# Patient Record
Sex: FEMALE | Race: WHITE | Employment: OTHER | URBAN - METROPOLITAN AREA
[De-identification: names, ages, dates, MRNs, and addresses within clinical notes are randomized per-mention and may not be internally consistent; named-entity substitution may affect disease eponyms.]

---

## 2019-03-08 ENCOUNTER — APPOINTMENT (OUTPATIENT)
Dept: GENERAL RADIOLOGY | Age: 84
DRG: 291 | End: 2019-03-08
Attending: EMERGENCY MEDICINE
Payer: MEDICARE

## 2019-03-08 ENCOUNTER — HOSPITAL ENCOUNTER (INPATIENT)
Age: 84
LOS: 3 days | Discharge: NURSING, REHAB OR LONG-TERM CARE | DRG: 291 | End: 2019-03-14
Attending: EMERGENCY MEDICINE | Admitting: INTERNAL MEDICINE
Payer: MEDICARE

## 2019-03-08 DIAGNOSIS — J90 PLEURAL EFFUSION: ICD-10-CM

## 2019-03-08 DIAGNOSIS — I48.20 CHRONIC ATRIAL FIBRILLATION (HCC): Primary | ICD-10-CM

## 2019-03-08 DIAGNOSIS — E87.70 HYPERVOLEMIA, UNSPECIFIED HYPERVOLEMIA TYPE: ICD-10-CM

## 2019-03-08 DIAGNOSIS — R06.02 SOB (SHORTNESS OF BREATH): ICD-10-CM

## 2019-03-08 PROBLEM — I50.9 CHF (CONGESTIVE HEART FAILURE) (HCC): Status: ACTIVE | Noted: 2019-03-08

## 2019-03-08 PROBLEM — R00.1 BRADYCARDIA: Status: ACTIVE | Noted: 2019-03-08

## 2019-03-08 LAB
ALBUMIN SERPL-MCNC: 3.6 G/DL (ref 3.2–4.6)
ALBUMIN/GLOB SERPL: 0.8 {RATIO} (ref 1.2–3.5)
ALP SERPL-CCNC: 98 U/L (ref 50–136)
ALT SERPL-CCNC: 8 U/L (ref 12–65)
ANION GAP SERPL CALC-SCNC: 8 MMOL/L (ref 7–16)
APTT PPP: 33.3 SEC (ref 24.7–39.8)
AST SERPL-CCNC: 10 U/L (ref 15–37)
ATRIAL RATE: 49 BPM
BASOPHILS # BLD: 0.1 K/UL (ref 0–0.2)
BASOPHILS NFR BLD: 1 % (ref 0–2)
BILIRUB SERPL-MCNC: 0.5 MG/DL (ref 0.2–1.1)
BNP SERPL-MCNC: 330 PG/ML
BUN SERPL-MCNC: 30 MG/DL (ref 8–23)
CALCIUM SERPL-MCNC: 8.7 MG/DL (ref 8.3–10.4)
CALCULATED R AXIS, ECG10: 80 DEGREES
CALCULATED T AXIS, ECG11: 67 DEGREES
CHLORIDE SERPL-SCNC: 104 MMOL/L (ref 98–107)
CO2 SERPL-SCNC: 23 MMOL/L (ref 21–32)
CREAT SERPL-MCNC: 1.21 MG/DL (ref 0.6–1)
DIAGNOSIS, 93000: NORMAL
DIFFERENTIAL METHOD BLD: ABNORMAL
EOSINOPHIL # BLD: 0.1 K/UL (ref 0–0.8)
EOSINOPHIL NFR BLD: 2 % (ref 0.5–7.8)
ERYTHROCYTE [DISTWIDTH] IN BLOOD BY AUTOMATED COUNT: 18.3 % (ref 11.9–14.6)
GLOBULIN SER CALC-MCNC: 4.4 G/DL (ref 2.3–3.5)
GLUCOSE SERPL-MCNC: 112 MG/DL (ref 65–100)
HCT VFR BLD AUTO: 37.2 % (ref 35.8–46.3)
HGB BLD-MCNC: 11.3 G/DL (ref 11.7–15.4)
IMM GRANULOCYTES # BLD AUTO: 0 K/UL (ref 0–0.5)
IMM GRANULOCYTES NFR BLD AUTO: 0 % (ref 0–5)
INR PPP: 1.1
LYMPHOCYTES # BLD: 1.1 K/UL (ref 0.5–4.6)
LYMPHOCYTES NFR BLD: 18 % (ref 13–44)
MAGNESIUM SERPL-MCNC: 2.3 MG/DL (ref 1.8–2.4)
MCH RBC QN AUTO: 25.9 PG (ref 26.1–32.9)
MCHC RBC AUTO-ENTMCNC: 30.4 G/DL (ref 31.4–35)
MCV RBC AUTO: 85.1 FL (ref 79.6–97.8)
MONOCYTES # BLD: 0.9 K/UL (ref 0.1–1.3)
MONOCYTES NFR BLD: 15 % (ref 4–12)
NEUTS SEG # BLD: 4 K/UL (ref 1.7–8.2)
NEUTS SEG NFR BLD: 64 % (ref 43–78)
NRBC # BLD: 0 K/UL (ref 0–0.2)
PLATELET # BLD AUTO: 220 K/UL (ref 150–450)
PMV BLD AUTO: 11 FL (ref 9.4–12.3)
POTASSIUM SERPL-SCNC: 3.5 MMOL/L (ref 3.5–5.1)
PROT SERPL-MCNC: 8 G/DL (ref 6.3–8.2)
PROTHROMBIN TIME: 13.7 SEC (ref 11.7–14.5)
Q-T INTERVAL, ECG07: 458 MS
QRS DURATION, ECG06: 86 MS
QTC CALCULATION (BEZET), ECG08: 438 MS
RBC # BLD AUTO: 4.37 M/UL (ref 4.05–5.2)
SODIUM SERPL-SCNC: 135 MMOL/L (ref 136–145)
TROPONIN I BLD-MCNC: 0.02 NG/ML (ref 0.02–0.05)
TSH SERPL DL<=0.005 MIU/L-ACNC: 2.85 UIU/ML (ref 0.36–3.74)
VENTRICULAR RATE, ECG03: 55 BPM
WBC # BLD AUTO: 6.3 K/UL (ref 4.3–11.1)

## 2019-03-08 PROCEDURE — 85610 PROTHROMBIN TIME: CPT

## 2019-03-08 PROCEDURE — 83880 ASSAY OF NATRIURETIC PEPTIDE: CPT

## 2019-03-08 PROCEDURE — 93005 ELECTROCARDIOGRAM TRACING: CPT | Performed by: EMERGENCY MEDICINE

## 2019-03-08 PROCEDURE — 84443 ASSAY THYROID STIM HORMONE: CPT

## 2019-03-08 PROCEDURE — 80053 COMPREHEN METABOLIC PANEL: CPT

## 2019-03-08 PROCEDURE — 85730 THROMBOPLASTIN TIME PARTIAL: CPT

## 2019-03-08 PROCEDURE — 74011250637 HC RX REV CODE- 250/637: Performed by: FAMILY MEDICINE

## 2019-03-08 PROCEDURE — 99285 EMERGENCY DEPT VISIT HI MDM: CPT | Performed by: EMERGENCY MEDICINE

## 2019-03-08 PROCEDURE — 99218 HC RM OBSERVATION: CPT

## 2019-03-08 PROCEDURE — 74011250636 HC RX REV CODE- 250/636: Performed by: NURSE PRACTITIONER

## 2019-03-08 PROCEDURE — 93306 TTE W/DOPPLER COMPLETE: CPT

## 2019-03-08 PROCEDURE — 74011250637 HC RX REV CODE- 250/637: Performed by: INTERNAL MEDICINE

## 2019-03-08 PROCEDURE — 83735 ASSAY OF MAGNESIUM: CPT

## 2019-03-08 PROCEDURE — 96374 THER/PROPH/DIAG INJ IV PUSH: CPT | Performed by: EMERGENCY MEDICINE

## 2019-03-08 PROCEDURE — 85025 COMPLETE CBC W/AUTO DIFF WBC: CPT

## 2019-03-08 PROCEDURE — 84484 ASSAY OF TROPONIN QUANT: CPT

## 2019-03-08 PROCEDURE — 71045 X-RAY EXAM CHEST 1 VIEW: CPT

## 2019-03-08 PROCEDURE — 74011250636 HC RX REV CODE- 250/636: Performed by: INTERNAL MEDICINE

## 2019-03-08 PROCEDURE — 74011250636 HC RX REV CODE- 250/636: Performed by: EMERGENCY MEDICINE

## 2019-03-08 RX ORDER — METOPROLOL SUCCINATE 50 MG/1
50 TABLET, EXTENDED RELEASE ORAL DAILY
Status: DISCONTINUED | OUTPATIENT
Start: 2019-03-09 | End: 2019-03-08

## 2019-03-08 RX ORDER — FUROSEMIDE 10 MG/ML
40 INJECTION INTRAMUSCULAR; INTRAVENOUS EVERY 12 HOURS
Status: DISCONTINUED | OUTPATIENT
Start: 2019-03-08 | End: 2019-03-08 | Stop reason: SDUPTHER

## 2019-03-08 RX ORDER — FACIAL-BODY WIPES
10 EACH TOPICAL DAILY PRN
Status: DISCONTINUED | OUTPATIENT
Start: 2019-03-08 | End: 2019-03-10

## 2019-03-08 RX ORDER — POTASSIUM CHLORIDE 20 MEQ/1
40 TABLET, EXTENDED RELEASE ORAL
Status: COMPLETED | OUTPATIENT
Start: 2019-03-08 | End: 2019-03-08

## 2019-03-08 RX ORDER — FUROSEMIDE 10 MG/ML
40 INJECTION INTRAMUSCULAR; INTRAVENOUS 2 TIMES DAILY
Status: DISCONTINUED | OUTPATIENT
Start: 2019-03-08 | End: 2019-03-09

## 2019-03-08 RX ORDER — HYDRALAZINE HYDROCHLORIDE 20 MG/ML
10 INJECTION INTRAMUSCULAR; INTRAVENOUS
Status: DISCONTINUED | OUTPATIENT
Start: 2019-03-08 | End: 2019-03-14 | Stop reason: HOSPADM

## 2019-03-08 RX ORDER — HYDROCODONE BITARTRATE AND ACETAMINOPHEN 5; 325 MG/1; MG/1
1 TABLET ORAL
Status: DISCONTINUED | OUTPATIENT
Start: 2019-03-08 | End: 2019-03-14 | Stop reason: HOSPADM

## 2019-03-08 RX ORDER — BENZONATATE 100 MG/1
100 CAPSULE ORAL
Status: DISCONTINUED | OUTPATIENT
Start: 2019-03-08 | End: 2019-03-14 | Stop reason: HOSPADM

## 2019-03-08 RX ORDER — ENOXAPARIN SODIUM 100 MG/ML
30 INJECTION SUBCUTANEOUS EVERY 24 HOURS
Status: DISCONTINUED | OUTPATIENT
Start: 2019-03-08 | End: 2019-03-13

## 2019-03-08 RX ORDER — AMLODIPINE BESYLATE 5 MG/1
5 TABLET ORAL DAILY
Status: DISCONTINUED | OUTPATIENT
Start: 2019-03-09 | End: 2019-03-14 | Stop reason: HOSPADM

## 2019-03-08 RX ORDER — FUROSEMIDE 10 MG/ML
40 INJECTION INTRAMUSCULAR; INTRAVENOUS
Status: COMPLETED | OUTPATIENT
Start: 2019-03-08 | End: 2019-03-08

## 2019-03-08 RX ORDER — LISINOPRIL 20 MG/1
20 TABLET ORAL 2 TIMES DAILY
Status: DISCONTINUED | OUTPATIENT
Start: 2019-03-08 | End: 2019-03-14 | Stop reason: HOSPADM

## 2019-03-08 RX ORDER — ATORVASTATIN CALCIUM 40 MG/1
40 TABLET, FILM COATED ORAL DAILY
Status: DISCONTINUED | OUTPATIENT
Start: 2019-03-09 | End: 2019-03-14 | Stop reason: HOSPADM

## 2019-03-08 RX ORDER — POTASSIUM CHLORIDE 20 MEQ/1
20 TABLET, EXTENDED RELEASE ORAL 2 TIMES DAILY
Status: DISCONTINUED | OUTPATIENT
Start: 2019-03-08 | End: 2019-03-09

## 2019-03-08 RX ORDER — NALOXONE HYDROCHLORIDE 0.4 MG/ML
0.4 INJECTION, SOLUTION INTRAMUSCULAR; INTRAVENOUS; SUBCUTANEOUS AS NEEDED
Status: DISCONTINUED | OUTPATIENT
Start: 2019-03-08 | End: 2019-03-14 | Stop reason: HOSPADM

## 2019-03-08 RX ORDER — ASPIRIN 81 MG/1
81 TABLET ORAL DAILY
Status: DISCONTINUED | OUTPATIENT
Start: 2019-03-09 | End: 2019-03-14 | Stop reason: HOSPADM

## 2019-03-08 RX ORDER — SODIUM CHLORIDE 0.9 % (FLUSH) 0.9 %
5-40 SYRINGE (ML) INJECTION EVERY 8 HOURS
Status: DISCONTINUED | OUTPATIENT
Start: 2019-03-08 | End: 2019-03-14 | Stop reason: HOSPADM

## 2019-03-08 RX ORDER — SODIUM CHLORIDE 0.9 % (FLUSH) 0.9 %
5-40 SYRINGE (ML) INJECTION AS NEEDED
Status: DISCONTINUED | OUTPATIENT
Start: 2019-03-08 | End: 2019-03-14 | Stop reason: HOSPADM

## 2019-03-08 RX ORDER — ACETAMINOPHEN 325 MG/1
650 TABLET ORAL
Status: DISCONTINUED | OUTPATIENT
Start: 2019-03-08 | End: 2019-03-14 | Stop reason: HOSPADM

## 2019-03-08 RX ORDER — METOPROLOL SUCCINATE 25 MG/1
25 TABLET, EXTENDED RELEASE ORAL DAILY
Status: DISCONTINUED | OUTPATIENT
Start: 2019-03-09 | End: 2019-03-08

## 2019-03-08 RX ORDER — FUROSEMIDE 10 MG/ML
40 INJECTION INTRAMUSCULAR; INTRAVENOUS EVERY 12 HOURS
Status: DISCONTINUED | OUTPATIENT
Start: 2019-03-08 | End: 2019-03-08

## 2019-03-08 RX ADMIN — LISINOPRIL 20 MG: 20 TABLET ORAL at 18:18

## 2019-03-08 RX ADMIN — POTASSIUM CHLORIDE 20 MEQ: 20 TABLET, EXTENDED RELEASE ORAL at 18:18

## 2019-03-08 RX ADMIN — Medication 5 ML: at 14:21

## 2019-03-08 RX ADMIN — Medication 5 ML: at 21:21

## 2019-03-08 RX ADMIN — POTASSIUM CHLORIDE 40 MEQ: 20 TABLET, EXTENDED RELEASE ORAL at 14:19

## 2019-03-08 RX ADMIN — HYDRALAZINE HYDROCHLORIDE 10 MG: 20 INJECTION INTRAMUSCULAR; INTRAVENOUS at 14:19

## 2019-03-08 RX ADMIN — FUROSEMIDE 40 MG: 10 INJECTION, SOLUTION INTRAMUSCULAR; INTRAVENOUS at 18:17

## 2019-03-08 RX ADMIN — FUROSEMIDE 40 MG: 10 INJECTION, SOLUTION INTRAMUSCULAR; INTRAVENOUS at 12:33

## 2019-03-08 RX ADMIN — BENZONATATE 100 MG: 100 CAPSULE ORAL at 22:29

## 2019-03-08 NOTE — H&P
Hospitalist H&P Note     Admit Date:  3/8/2019 10:31 AM   Name:  Ginny Mcqueen   Age:  80 y.o.  :  10/23/1926   MRN:  [id number]   PCP:  Susan Dove MD  Treatment Team: Attending Provider: Melida Oliveira MD; Primary Nurse: Jackson Robertson RN    HPI:   Patient history was obtained from the ER provider prior to seeing the patient. Patient with atrial fibrillation and high blood pressure. On amlodipine, Lotensin and metoprolol 50 mg. Went to primary care doctor's office with worsening shortness of breath and bilateral lower extremity edema along with a 10 pound weight gain. Has had a cough for the past week. On his evaluation noted, heart rate 34. On EKG. Sent here for further evaluation.        The history is provided by the patient and medical records. No  was used. Slow Heart Rate    This is a new problem. The current episode started less than 1 hour ago. The problem has not changed since onset. The problem occurs constantly. The pain is associated with normal activity. The patient is experiencing no pain. Associated symptoms include lower extremity edema, malaise/fatigue and shortness of breath. Pertinent negatives include no abdominal pain, no back pain, no diaphoresis, no dizziness, no exertional chest pressure, no fever, no headaches, no leg pain, no nausea, no numbness, no vomiting and no weakness. She has tried nothing for the symptoms. Risk factors include cardiac disease and hypertension. Her past medical history is significant for HTN.    3/8/2019  Cc: dyspnea  Progressive dyspnea lower ext edema and orthopnea last few days especially. Ventricular response low at primary care today and sent to er. She has afib chronically on bb rate control (unclear if toprol xl 50 daily or met. tartrate 50 bid), and aspirin. No cva prophyl with anticoag. Dure to age fall risk. She has cxr and exam c/w acute chf systolic vs diastolic. No hx of echo.  She has k of 3.5 and acute renal insuff creatinine 1.21 baseline <1 but she is 89 y.0. She is to be admitted re: acute new chf, acute renal insuff. Observation for now. 10 systems reviewed and negative except as noted in HPI. Past Medical History:   Diagnosis Date    CAD (coronary artery disease) 1/22/2013    Chronic atrial fibrillation ([facility] [address] 1/22/2013    CRI (chronic renal insufficiency), stage 3 (moderate) (HCC)     Gait disturbance 1/22/2013    Mixed hyperlipidemia 1/22/2013    Unspecified essential hypertension 1/22/2013      Past Surgical History:   Procedure Laterality Date    HX APPENDECTOMY      HX COLONOSCOPY  1996      No Known Allergies   Social History     Tobacco Use    Smoking status: Never Smoker    Smokeless tobacco: Never Used   Substance Use Topics    Alcohol use: No      History reviewed. No pertinent family history. Immunization History   Administered Date(s) Administered    Pneumococcal Polysaccharide (PPSV-23) 10/02/2000     PTA Medications:  Prior to Admission Medications   Prescriptions Last Dose Informant Patient Reported? Taking? amLODIPine (NORVASC) 2.5 mg tablet   No No   Sig: Take 1 Tab by mouth daily. aspirin delayed-release 81 mg tablet   Yes No   Sig: Take  by mouth daily. atorvastatin (LIPITOR) 40 mg tablet   No No   Sig: Take 1 Tab by mouth daily. benazepril-hydroCHLOROthiazide (LOTENSIN HCT) 20-12.5 mg per tablet   No No   Sig: TAKE TWO TABLETS BY MOUTH DAILY   cyanocobalamin (VITAMIN B-12) 1,000 mcg tablet   No No   Sig: Take 1 tablet by mouth daily.    ferrous sulfate 325 mg (65 mg iron) tablet   No No   Sig: One pill every other day   metoprolol tartrate (LOPRESSOR) 50 mg tablet   No No   Sig: TAKE ONE TABLET BY MOUTH TWICE DAILY      Facility-Administered Medications: None       Objective:     Patient Vitals for the past 24 hrs:   Temp Pulse Resp BP SpO2   03/08/19 1201    185/79 95 %   03/08/19 1156     96 %   03/08/19 1130    175/75 91 %   03/08/19 1036 98.4 °F (36.9 °C) (!) 58 18 (!) 208/91 97 %     Oxygen Therapy  O2 Sat (%): 95 % (03/08/19 1201)  Pulse via Oximetry: 47 beats per minute (03/08/19 1201)  O2 Device: Room air (03/08/19 1036)  No intake or output data in the 24 hours ending 03/08/19 1233    Physical Exam:  General:    Well nourished. Alert. Maintained well for age  Eyes:   Normal sclera. Extraocular movements intact. ENT:  Normocephalic, atraumatic. Moist mucous membranes  Neck:  Supple, no lymphadenopathy positive jvd  CV:   Irregular vent. rate is 50's to 60's at time my exam.  No m/r/g. Peripheral pulses 2+. Capillary refill <2s. Lungs:  Bilateral rales to bases, no wheeze  Abdomen: Soft, nontender, nondistended. Extremities: Warm and dry. No cyanosis positive lower extremity bl edema. Neurologic: CN II-XII grossly intact. Sensation intact. Skin:     No rashes or jaundice. Normal coloration  Psych:  Normal mood and affect. I reviewed the labs, imaging, EKGs, telemetry, and other studies done this admission. Data Review:   Recent Results (from the past 24 hour(s))   CBC WITH AUTOMATED DIFF    Collection Time: 03/08/19 10:44 AM   Result Value Ref Range    WBC 6.3 4.3 - 11.1 K/uL    RBC 4.37 4.05 - 5.2 M/uL    HGB 11.3 (L) 11.7 - 15.4 g/dL    HCT 37.2 35.8 - 46.3 %    MCV 85.1 79.6 - 97.8 FL    MCH 25.9 (L) 26.1 - 32.9 PG    MCHC 30.4 (L) 31.4 - 35.0 g/dL    RDW 18.3 (H) 11.9 - 14.6 %    PLATELET 635 791 - 151 K/uL    MPV 11.0 9.4 - 12.3 FL    ABSOLUTE NRBC 0.00 0.0 - 0.2 K/uL    DF AUTOMATED      NEUTROPHILS 64 43 - 78 %    LYMPHOCYTES 18 13 - 44 %    MONOCYTES 15 (H) 4.0 - 12.0 %    EOSINOPHILS 2 0.5 - 7.8 %    BASOPHILS 1 0.0 - 2.0 %    IMMATURE GRANULOCYTES 0 0.0 - 5.0 %    ABS. NEUTROPHILS 4.0 1.7 - 8.2 K/UL    ABS. LYMPHOCYTES 1.1 0.5 - 4.6 K/UL    ABS. MONOCYTES 0.9 0.1 - 1.3 K/UL    ABS. EOSINOPHILS 0.1 0.0 - 0.8 K/UL    ABS. BASOPHILS 0.1 0.0 - 0.2 K/UL    ABS. IMM.  GRANS. 0.0 0.0 - 0.5 K/UL   METABOLIC PANEL, COMPREHENSIVE    Collection Time: 03/08/19 10:44 AM   Result Value Ref Range    Sodium 135 (L) 136 - 145 mmol/L    Potassium 3.5 3.5 - 5.1 mmol/L    Chloride 104 98 - 107 mmol/L    CO2 23 21 - 32 mmol/L    Anion gap 8 7 - 16 mmol/L    Glucose 112 (H) 65 - 100 mg/dL    BUN 30 (H) 8 - 23 MG/DL    Creatinine 1.21 (H) 0.6 - 1.0 MG/DL    GFR est AA 54 (L) >60 ml/min/1.73m2    GFR est non-AA 44 (L) >60 ml/min/1.73m2    Calcium 8.7 8.3 - 10.4 MG/DL    Bilirubin, total 0.5 0.2 - 1.1 MG/DL    ALT (SGPT) 8 (L) 12 - 65 U/L    AST (SGOT) 10 (L) 15 - 37 U/L    Alk. phosphatase 98 50 - 136 U/L    Protein, total 8.0 6.3 - 8.2 g/dL    Albumin 3.6 3.2 - 4.6 g/dL    Globulin 4.4 (H) 2.3 - 3.5 g/dL    A-G Ratio 0.8 (L) 1.2 - 3.5     MAGNESIUM    Collection Time: 03/08/19 10:44 AM   Result Value Ref Range    Magnesium 2.3 1.8 - 2.4 mg/dL   TSH 3RD GENERATION    Collection Time: 03/08/19 10:44 AM   Result Value Ref Range    TSH 2.850 0.358 - 3.740 uIU/mL   BNP    Collection Time: 03/08/19 10:44 AM   Result Value Ref Range     (H) 0 pg/mL   PROTHROMBIN TIME + INR    Collection Time: 03/08/19 10:44 AM   Result Value Ref Range    Prothrombin time 13.7 11.7 - 14.5 sec    INR 1.1     PTT    Collection Time: 03/08/19 10:44 AM   Result Value Ref Range    aPTT 33.3 24.7 - 39.8 SEC   POC TROPONIN-I    Collection Time: 03/08/19 11:07 AM   Result Value Ref Range    Troponin-I (POC) 0.02 0.02 - 0.05 ng/ml       All Micro Results     None          Other Studies:  Xr Chest Port    Result Date: 3/8/2019  Portable chest x-ray INDICATION: Generalized weakness with fatigue FINDINGS: No prior studies for comparison. EKG leads are present over the chest. Cardiac silhouette is enlarged. Small bilateral pleural effusions with diffuse interstitial lung prominence. This is probably pulmonary edema. No convincing pneumothorax. IMPRESSION: Cardiomegaly and pulmonary edema. CHF exacerbation with small pleural effusions.       Assessment and Plan: Hospital Problems as of 3/8/2019 Date Reviewed: 3/8/2019    None          PLAN:  --acute chf syst vs dd    Echo, card consult. Lisinopril bid, toprol xl 50, lasix 40 iv bid for now.     --acute renal insuff. ckd II    Monitor with treatment chf.     --chronic afib. Bb rate control . Agree risk benefit of a.c.   --borderline hypokalemia   Oral replace. Check mg, goal k 4 mg 2    Will need fixed dose when oral loop diuretic dose is determined. Discharge planning:  Home   DVT ppx: lovenox    Code status:  dnr  Decision Maker:self. aAron Ruby son is fernandaoa lives next door.  [phone] [facility] is at bedside      Admit status:observation          Estimated LOS: less than 2  midnights  Risk:  high    Signed:  Jeannie Scanlon DO

## 2019-03-08 NOTE — ED PROVIDER NOTES
Patient with atrial fibrillation and high blood pressure. On amlodipine, Lotensin and metoprolol 50 mg. Went to primary care doctor's office with worsening shortness of breath and bilateral lower extremity edema along with a 10 pound weight gain. Has had a cough for the past week. On his evaluation noted, heart rate 34. On EKG. Sent here for further evaluation. The history is provided by the patient and medical records. No  was used. Slow Heart Rate    This is a new problem. The current episode started less than 1 hour ago. The problem has not changed since onset. The problem occurs constantly. The pain is associated with normal activity. The patient is experiencing no pain. Associated symptoms include lower extremity edema, malaise/fatigue and shortness of breath. Pertinent negatives include no abdominal pain, no back pain, no diaphoresis, no dizziness, no exertional chest pressure, no fever, no headaches, no leg pain, no nausea, no numbness, no vomiting and no weakness. She has tried nothing for the symptoms. Risk factors include cardiac disease and hypertension. Her past medical history is significant for HTN. Past Medical History:   Diagnosis Date    CAD (coronary artery disease) 1/22/2013    Chronic atrial fibrillation ([facility] [address] 1/22/2013    CRI (chronic renal insufficiency), stage 3 (moderate) (HCC)     Gait disturbance 1/22/2013    Mixed hyperlipidemia 1/22/2013    Unspecified essential hypertension 1/22/2013       Past Surgical History:   Procedure Laterality Date    HX APPENDECTOMY      HX COLONOSCOPY  1996         History reviewed. No pertinent family history.     Social History     Socioeconomic History    Marital status:      Spouse name: Not on file    Number of children: Not on file    Years of education: Not on file    Highest education level: Not on file   Social Needs    Financial resource strain: Not on file    Food insecurity - worry: Not on file  Food insecurity - inability: Not on file    Transportation needs - medical: Not on file   FANCRU needs - non-medical: Not on file   Occupational History    Not on file   Tobacco Use    Smoking status: Never Smoker    Smokeless tobacco: Never Used   Substance and Sexual Activity    Alcohol use: No    Drug use: Not on file    Sexual activity: Not on file   Other Topics Concern    Not on file   Social History Narrative    Son Ines Iyer lives next door, has HCPOA. Patient has living will and desires no CPR, She refuses all screening exams. She gets Meals on Wheels. Her daughter comes over once a week to do [employer]. Her son does her finances. She does not drive. She walks with a 4 point cane. ALLERGIES: Patient has no known allergies. Review of Systems   Constitutional: Positive for fatigue and malaise/fatigue. Negative for chills, diaphoresis and fever. HENT: Negative for rhinorrhea and sore throat. Eyes: Negative for pain and redness. Respiratory: Positive for shortness of breath. Negative for chest tightness and wheezing. Cardiovascular: Positive for leg swelling. Negative for chest pain. Gastrointestinal: Negative for abdominal pain, diarrhea, nausea and vomiting. Genitourinary: Negative for dysuria and hematuria. Musculoskeletal: Negative for back pain, gait problem, neck pain and neck stiffness. Skin: Negative for color change and rash. Neurological: Negative for dizziness, weakness, numbness and headaches. Vitals:    03/08/19 1036   BP: (!) 208/91   Pulse: (!) 58   Resp: 18   Temp: 98.4 °F (36.9 °C)   SpO2: 97%   Weight: 73.9 kg (163 lb)   Height: 5' 3\" (1.6 m)            Physical Exam   Constitutional: She is oriented to person, place, and time. She appears well-developed and well-nourished. HENT:   Head: Normocephalic and atraumatic. Neck: Normal range of motion. Neck supple. Cardiovascular:  An irregularly irregular rhythm present. Bradycardia present. Pulmonary/Chest: Breath sounds normal. She is in respiratory distress (mild increased effort). Abdominal: Soft. Bowel sounds are normal. There is no tenderness. Musculoskeletal: Normal range of motion. She exhibits edema. Neurological: She is alert and oriented to person, place, and time. Skin: Skin is warm and dry. MDM  Number of Diagnoses or Management Options  Chronic atrial fibrillation ([address]): Hypervolemia, unspecified hypervolemia type:   Pleural effusion:   SOB (shortness of breath):   Diagnosis management comments: Patient with atrial fibrillation and heart rate dropping into the low 40s every once in a while. Had an episode in the mid 35s at primary care doctor's office. Also, has shortness of breath and volume overload with right pleural effusion, so will admit to the hospital.       Amount and/or Complexity of Data Reviewed  Clinical lab tests: reviewed and ordered  Tests in the radiology section of CPT®: ordered and reviewed  Tests in the medicine section of CPT®: ordered and reviewed    Patient Progress  Patient progress: stable         Procedures      EKG: nonspecific ST and T waves changes, atrial fibrillation, rate 55. XR CHEST PORT (Final result)   Result time 03/08/19 12:00:41   Final result by Barrington Mcgrath MD (03/08/19 12:00:41)                Impression:    IMPRESSION: Cardiomegaly and pulmonary edema. CHF exacerbation with small  pleural effusions. Narrative:    Portable chest x-ray    INDICATION: Generalized weakness with fatigue    FINDINGS: No prior studies for comparison. EKG leads are present over the chest.  Cardiac silhouette is enlarged. Small bilateral pleural effusions with diffuse  interstitial lung prominence. This is probably pulmonary edema. No convincing  pneumothorax.               Results Include:    Recent Results (from the past 24 hour(s))   CBC WITH AUTOMATED DIFF    Collection Time: 03/08/19 10:44 AM Result Value Ref Range    WBC 6.3 4.3 - 11.1 K/uL    RBC 4.37 4.05 - 5.2 M/uL    HGB 11.3 (L) 11.7 - 15.4 g/dL    HCT 37.2 35.8 - 46.3 %    MCV 85.1 79.6 - 97.8 FL    MCH 25.9 (L) 26.1 - 32.9 PG    MCHC 30.4 (L) 31.4 - 35.0 g/dL    RDW 18.3 (H) 11.9 - 14.6 %    PLATELET 336 329 - 426 K/uL    MPV 11.0 9.4 - 12.3 FL    ABSOLUTE NRBC 0.00 0.0 - 0.2 K/uL    DF AUTOMATED      NEUTROPHILS 64 43 - 78 %    LYMPHOCYTES 18 13 - 44 %    MONOCYTES 15 (H) 4.0 - 12.0 %    EOSINOPHILS 2 0.5 - 7.8 %    BASOPHILS 1 0.0 - 2.0 %    IMMATURE GRANULOCYTES 0 0.0 - 5.0 %    ABS. NEUTROPHILS 4.0 1.7 - 8.2 K/UL    ABS. LYMPHOCYTES 1.1 0.5 - 4.6 K/UL    ABS. MONOCYTES 0.9 0.1 - 1.3 K/UL    ABS. EOSINOPHILS 0.1 0.0 - 0.8 K/UL    ABS. BASOPHILS 0.1 0.0 - 0.2 K/UL    ABS. IMM. GRANS. 0.0 0.0 - 0.5 K/UL   METABOLIC PANEL, COMPREHENSIVE    Collection Time: 03/08/19 10:44 AM   Result Value Ref Range    Sodium 135 (L) 136 - 145 mmol/L    Potassium 3.5 3.5 - 5.1 mmol/L    Chloride 104 98 - 107 mmol/L    CO2 23 21 - 32 mmol/L    Anion gap 8 7 - 16 mmol/L    Glucose 112 (H) 65 - 100 mg/dL    BUN 30 (H) 8 - 23 MG/DL    Creatinine 1.21 (H) 0.6 - 1.0 MG/DL    GFR est AA 54 (L) >60 ml/min/1.73m2    GFR est non-AA 44 (L) >60 ml/min/1.73m2    Calcium 8.7 8.3 - 10.4 MG/DL    Bilirubin, total 0.5 0.2 - 1.1 MG/DL    ALT (SGPT) 8 (L) 12 - 65 U/L    AST (SGOT) 10 (L) 15 - 37 U/L    Alk.  phosphatase 98 50 - 136 U/L    Protein, total 8.0 6.3 - 8.2 g/dL    Albumin 3.6 3.2 - 4.6 g/dL    Globulin 4.4 (H) 2.3 - 3.5 g/dL    A-G Ratio 0.8 (L) 1.2 - 3.5     MAGNESIUM    Collection Time: 03/08/19 10:44 AM   Result Value Ref Range    Magnesium 2.3 1.8 - 2.4 mg/dL   TSH 3RD GENERATION    Collection Time: 03/08/19 10:44 AM   Result Value Ref Range    TSH 2.850 0.358 - 3.740 uIU/mL   BNP    Collection Time: 03/08/19 10:44 AM   Result Value Ref Range     (H) 0 pg/mL   PROTHROMBIN TIME + INR    Collection Time: 03/08/19 10:44 AM   Result Value Ref Range    Prothrombin time 13.7 11.7 - 14.5 sec    INR 1.1     PTT    Collection Time: 03/08/19 10:44 AM   Result Value Ref Range    aPTT 33.3 24.7 - 39.8 SEC   POC TROPONIN-I    Collection Time: 03/08/19 11:07 AM   Result Value Ref Range    Troponin-I (POC) 0.02 0.02 - 0.05 ng/ml

## 2019-03-08 NOTE — ED TRIAGE NOTES
Patient was at her family doctor today and was told her hr was low there in the 30's. Patient states she was there for cough and congestion. Patient also complains of generalized weakness, fatigue and shortness of breath.

## 2019-03-08 NOTE — CONSULTS
[facility] Cardiology Consult                Date of  Admission: 3/8/2019 10:31 AM     Primary Care Physician: Dr. Hi Bishop  Primary Cardiologist: CELY  Referring Physician: Hospitalist   Consulting Physician: Dr. Vipin Doss    CC/Reason for consult: CHF      Thaddeus Chavez is a 80 y.o. female with prior h/o HTN, HLP, and permanent A. fib that declines anticoagulation. .  Patient was seen in PCPs office today with c/o SOB/cough and lower ext edema x 1 week with 10 pound weight gain. The patient had an EKG that showed HR 34 so she was sent to ED for further care. Labs in ED showed trop neg, , chest xray showed Cardiomegaly and pulmonary edema. CHF exacerbation with small pleural effusions. EKG showed A. Fib with SVR. Hospitalist admitted for CHF and CLAUDE and consulted cardiology for CHF. Lasix IV given in ED prior to admission and her B/P was 208/91. Diagnosis    Mixed hyperlipidemia    Hypertension    Chronic atrial fibrillation (HCC)    Gait disturbance    Carotid atherosclerosis    Frail elderly    MCI (mild cognitive impairment)    Vitamin B12 deficiency    CRI (chronic renal insufficiency), stage 3 (moderate) (HCC)    Shortness of breath    Bradycardia    CHF (congestive heart failure) (HCC)       Past Medical History:   Diagnosis Date    CAD (coronary artery disease) 1/22/2013    Chronic atrial fibrillation (HCC) 1/22/2013    CRI (chronic renal insufficiency), stage 3 (moderate) (HCC)     Gait disturbance 1/22/2013    Mixed hyperlipidemia 1/22/2013    Unspecified essential hypertension 1/22/2013      Past Surgical History:   Procedure Laterality Date    HX APPENDECTOMY      HX COLONOSCOPY  1996     No Known Allergies   History reviewed. No pertinent family history.      Current Facility-Administered Medications   Medication Dose Route Frequency    sodium chloride (NS) flush 5-40 mL  5-40 mL IntraVENous Q8H    sodium chloride (NS) flush 5-40 mL  5-40 mL IntraVENous PRN    acetaminophen (TYLENOL) tablet 650 mg  650 mg Oral Q4H PRN    HYDROcodone-acetaminophen (NORCO) 5-325 mg per tablet 1 Tab  1 Tab Oral Q4H PRN    naloxone (NARCAN) injection 0.4 mg  0.4 mg IntraVENous PRN    bisacodyl (DULCOLAX) suppository 10 mg  10 mg Rectal DAILY PRN    enoxaparin (LOVENOX) injection 30 mg  30 mg SubCUTAneous Q24H    potassium chloride (K-DUR, KLOR-CON) SR tablet 40 mEq  40 mEq Oral NOW    potassium chloride (K-DUR, KLOR-CON) SR tablet 20 mEq  20 mEq Oral BID    furosemide (LASIX) injection 40 mg  40 mg IntraVENous Q12H    lisinopril (PRINIVIL, ZESTRIL) tablet 20 mg  20 mg Oral BID    [START ON 3/9/2019] metoprolol succinate (TOPROL-XL) XL tablet 50 mg  50 mg Oral DAILY    [START ON 3/9/2019] atorvastatin (LIPITOR) tablet 40 mg  40 mg Oral DAILY    [START ON 3/9/2019] aspirin delayed-release tablet 81 mg  81 mg Oral DAILY    hydrALAZINE (APRESOLINE) 20 mg/mL injection 10 mg  10 mg IntraVENous Q8H PRN       Review of Systems   Constitution: Positive for weight gain. Negative for diaphoresis, weakness and malaise/fatigue. HENT: Negative for congestion. Cardiovascular: Positive for dyspnea on exertion and leg swelling. Negative for chest pain, claudication, cyanosis, irregular heartbeat, near-syncope, orthopnea, palpitations, paroxysmal nocturnal dyspnea and syncope. Respiratory: Positive for shortness of breath. Negative for cough and wheezing. Endocrine: Negative for cold intolerance and heat intolerance. Hematologic/Lymphatic: Does not bruise/bleed easily. Skin: Negative for nail changes. Neurological: Negative for dizziness and headaches.         Physical Exam  Vitals:    03/08/19 1231 03/08/19 1233 03/08/19 1259 03/08/19 1342   BP: 191/79 191/79  195/87   Pulse:  (!) 55  76   Resp:    16   Temp:    97.8 °F (36.6 °C)   SpO2: 94%  96% 96%   Weight:    73.5 kg (162 lb 1.6 oz)   Height:           Physical Exam:  General: Well Developed, Well Nourished, No Acute Distress  HEENT: pupils equal and round, no abnormalities noted  Neck: supple, no JVD, no carotid bruits  Heart: S1S2 irregular irregular and bradycardic without murmurs or gallops  Lungs: few scattered crackles but diminished in bases   Abd: soft, nontender, nondistended, with good bowel sounds  Ext: warm, 2+ edema, calves supple/nontender, pulses 2+ bilaterally  Skin: warm and dry  Psychiatric: Normal mood and affect  Neurologic: Alert and oriented X 3    Cardiographics    Telemetry: A. Fib with SVR  ECG: A. Fib with SVR  Echocardiogram: ordered    Labs:   Recent Labs     03/08/19  1044   *   K 3.5   MG 2.3   BUN 30*   CREA 1.21*   *   WBC 6.3   HGB 11.3*   HCT 37.2      INR 1.1        Assessment/Plan:     Assessment:      Principal Problem:    CHF (congestive heart failure) ([facility] [address] (6/6/0978)- likely diastolic HF secondary to uncontrolled HTN. Echo pending today. Will need B/P control. Will increase norvasc to 5 mg daily. Will continue IV lasix and daily labs. Also continue toprol (but lower dose with slower rates) and ACE. Active Problems:    Mixed hyperlipidemia (1/22/2013)- continue statin       Hypertension (1/22/2013)- not controlled on admission; improved now. Will increase norvasc. Chronic atrial fibrillation (HCC) (1/22/2013)- with SVR; will decrease Toprol dose. Patient refuses any aggressive measures and declines anticoagulation. Thank you very much for this referral. We appreciate the opportunity to participate in this patient's care. We will follow along with above stated plan.     Anamika Patrick NP  Consulting MD: Dr. Autumn Heart

## 2019-03-08 NOTE — PROGRESS NOTES
TRANSFER - IN REPORT:    Verbal report received from [facility], [address] on Anisa Mejía being received from ER for routine progression of care      Report consisted of patients Situation, Background, Assessment and Recommendations(SBAR). Information from the following report(s) SBAR, Kardex, [facility] and Recent Results was reviewed with the receiving nurse. Opportunity for questions and clarification was provided. Assessment completed upon patients arrival to unit and care assumed.

## 2019-03-08 NOTE — ED NOTES
Pt assisted to [facility] with grand-daughter. Pt is easily one assist and was able to transfer without difficulty, but appears anxious.

## 2019-03-08 NOTE — PROGRESS NOTES
CM chart review. PCP listed as Dr. Oscar Payne [facility]) / patient seen in office today 3-8-19.

## 2019-03-08 NOTE — ROUTINE PROCESS
TRANSFER - OUT REPORT:    Verbal report given to Major Devine RN  on Donna Moment  being transferred to [address] [place] [address] for routine progression of care       Report consisted of patients Situation, Background, Assessment and   Recommendations(SBAR). Information from the following report(s) SBAR was reviewed with the receiving nurse. Lines:   Peripheral IV 03/08/19 Right Antecubital (Active)   Site Assessment Clean, dry, & intact 3/8/2019 10:38 AM   Phlebitis Assessment 0 3/8/2019 10:38 AM   Infiltration Assessment 0 3/8/2019 10:38 AM   Dressing Status Clean, dry, & intact 3/8/2019 10:38 AM        Opportunity for questions and clarification was provided.       Patient transported with:   Monitor  Registered Nurse          19

## 2019-03-09 LAB
ALBUMIN SERPL-MCNC: 3.5 G/DL (ref 3.2–4.6)
ALBUMIN/GLOB SERPL: 0.9 {RATIO} (ref 1.2–3.5)
ALP SERPL-CCNC: 89 U/L (ref 50–136)
ALT SERPL-CCNC: 11 U/L (ref 12–65)
ANION GAP SERPL CALC-SCNC: 9 MMOL/L (ref 7–16)
AST SERPL-CCNC: 17 U/L (ref 15–37)
BASOPHILS # BLD: 0.1 K/UL (ref 0–0.2)
BASOPHILS NFR BLD: 1 % (ref 0–2)
BILIRUB SERPL-MCNC: 0.6 MG/DL (ref 0.2–1.1)
BUN SERPL-MCNC: 28 MG/DL (ref 8–23)
CALCIUM SERPL-MCNC: 8.7 MG/DL (ref 8.3–10.4)
CHLORIDE SERPL-SCNC: 104 MMOL/L (ref 98–107)
CO2 SERPL-SCNC: 24 MMOL/L (ref 21–32)
CREAT SERPL-MCNC: 1.09 MG/DL (ref 0.6–1)
DIFFERENTIAL METHOD BLD: ABNORMAL
EOSINOPHIL # BLD: 0 K/UL (ref 0–0.8)
EOSINOPHIL NFR BLD: 0 % (ref 0.5–7.8)
ERYTHROCYTE [DISTWIDTH] IN BLOOD BY AUTOMATED COUNT: 18.3 % (ref 11.9–14.6)
GLOBULIN SER CALC-MCNC: 4 G/DL (ref 2.3–3.5)
GLUCOSE SERPL-MCNC: 110 MG/DL (ref 65–100)
HCT VFR BLD AUTO: 31.8 % (ref 35.8–46.3)
HGB BLD-MCNC: 10.1 G/DL (ref 11.7–15.4)
IMM GRANULOCYTES # BLD AUTO: 0 K/UL (ref 0–0.5)
IMM GRANULOCYTES NFR BLD AUTO: 0 % (ref 0–5)
LYMPHOCYTES # BLD: 1 K/UL (ref 0.5–4.6)
LYMPHOCYTES NFR BLD: 14 % (ref 13–44)
MAGNESIUM SERPL-MCNC: 2 MG/DL (ref 1.8–2.4)
MCH RBC QN AUTO: 26 PG (ref 26.1–32.9)
MCHC RBC AUTO-ENTMCNC: 31.8 G/DL (ref 31.4–35)
MCV RBC AUTO: 82 FL (ref 79.6–97.8)
MONOCYTES # BLD: 1.2 K/UL (ref 0.1–1.3)
MONOCYTES NFR BLD: 16 % (ref 4–12)
NEUTS SEG # BLD: 5.1 K/UL (ref 1.7–8.2)
NEUTS SEG NFR BLD: 69 % (ref 43–78)
NRBC # BLD: 0 K/UL (ref 0–0.2)
PLATELET # BLD AUTO: 202 K/UL (ref 150–450)
PMV BLD AUTO: 10.5 FL (ref 9.4–12.3)
POTASSIUM SERPL-SCNC: 4.2 MMOL/L (ref 3.5–5.1)
PROT SERPL-MCNC: 7.5 G/DL (ref 6.3–8.2)
RBC # BLD AUTO: 3.88 M/UL (ref 4.05–5.2)
SODIUM SERPL-SCNC: 137 MMOL/L (ref 136–145)
WBC # BLD AUTO: 7.4 K/UL (ref 4.3–11.1)

## 2019-03-09 PROCEDURE — 80053 COMPREHEN METABOLIC PANEL: CPT

## 2019-03-09 PROCEDURE — 74011250636 HC RX REV CODE- 250/636: Performed by: INTERNAL MEDICINE

## 2019-03-09 PROCEDURE — 85025 COMPLETE CBC W/AUTO DIFF WBC: CPT

## 2019-03-09 PROCEDURE — 83735 ASSAY OF MAGNESIUM: CPT

## 2019-03-09 PROCEDURE — 74011250637 HC RX REV CODE- 250/637: Performed by: NURSE PRACTITIONER

## 2019-03-09 PROCEDURE — 74011250637 HC RX REV CODE- 250/637: Performed by: FAMILY MEDICINE

## 2019-03-09 PROCEDURE — 99218 HC RM OBSERVATION: CPT

## 2019-03-09 PROCEDURE — 74011250637 HC RX REV CODE- 250/637: Performed by: INTERNAL MEDICINE

## 2019-03-09 PROCEDURE — 36415 COLL VENOUS BLD VENIPUNCTURE: CPT

## 2019-03-09 RX ORDER — SPIRONOLACTONE 25 MG/1
25 TABLET ORAL DAILY
Status: DISCONTINUED | OUTPATIENT
Start: 2019-03-09 | End: 2019-03-14 | Stop reason: HOSPADM

## 2019-03-09 RX ORDER — FUROSEMIDE 40 MG/1
40 TABLET ORAL DAILY
Status: DISCONTINUED | OUTPATIENT
Start: 2019-03-09 | End: 2019-03-14 | Stop reason: HOSPADM

## 2019-03-09 RX ADMIN — ASPIRIN 81 MG: 81 TABLET, COATED ORAL at 09:21

## 2019-03-09 RX ADMIN — LISINOPRIL 20 MG: 20 TABLET ORAL at 09:21

## 2019-03-09 RX ADMIN — ACETAMINOPHEN 650 MG: 325 TABLET, FILM COATED ORAL at 00:13

## 2019-03-09 RX ADMIN — AMLODIPINE BESYLATE 5 MG: 5 TABLET ORAL at 09:21

## 2019-03-09 RX ADMIN — ATORVASTATIN CALCIUM 40 MG: 40 TABLET, FILM COATED ORAL at 09:20

## 2019-03-09 RX ADMIN — LISINOPRIL 20 MG: 20 TABLET ORAL at 18:06

## 2019-03-09 RX ADMIN — ACETAMINOPHEN 650 MG: 325 TABLET, FILM COATED ORAL at 21:19

## 2019-03-09 RX ADMIN — Medication 5 ML: at 06:01

## 2019-03-09 RX ADMIN — SPIRONOLACTONE 25 MG: 25 TABLET ORAL at 09:22

## 2019-03-09 RX ADMIN — ENOXAPARIN SODIUM 30 MG: 30 INJECTION, SOLUTION INTRAVENOUS; SUBCUTANEOUS at 14:23

## 2019-03-09 RX ADMIN — BENZONATATE 100 MG: 100 CAPSULE ORAL at 06:05

## 2019-03-09 RX ADMIN — Medication 5 ML: at 21:19

## 2019-03-09 RX ADMIN — FUROSEMIDE 40 MG: 40 TABLET ORAL at 09:21

## 2019-03-09 RX ADMIN — BENZONATATE 100 MG: 100 CAPSULE ORAL at 14:22

## 2019-03-09 NOTE — PROGRESS NOTES
Dual skin assessment completed & reveals the following: sacrum dry & intact, no redness or breakdown noted. Heels intact bilaterally. BLE with 2-3+ pitting edema, BLE flaky & dry with no breakdown. R buttocks with approx 8 healing scabbed circles approx 1cm in diameter- pt reports recent shingles outbreak. None are draining- all healed or scabbed over. Scattered scabs to BUE, no other abnormalities noted.

## 2019-03-09 NOTE — PROGRESS NOTES
Progress Note    Patient: Padmaja Du MRN: [id number]  SSN: [id number]    YOB: 1926  Age: 80 y.o. Sex: female      Admit Date: 3/8/2019    LOS: 0 days     Subjective:     PMH of AF, hypertension. Admitted due to weight gain, edema, shortness of breath, cough. Found to have bradycardia, CHF, CLAUDE. Improved with Lasix. Patient is breathing better. She is eating better. No chest pain. Less shortness of breath. No fever. No shaking. No chills. Objective:     Vitals:    03/09/19 0046 03/09/19 0457 03/09/19 0608 03/09/19 0911   BP: 154/68 168/70 158/54 154/54   Pulse: 66 80 81 79   Resp: 18 18  18   Temp: 98.4 °F (36.9 °C) 99 °F (37.2 °C)  98.8 °F (37.1 °C)   SpO2: 94% 94%  96%   Weight:  70.2 kg (154 lb 12.8 oz)     Height:            Intake and Output:  Current Shift: 03/09 0701 - 03/09 1900  In: -   Out: 300 [Urine:300]  Last three shifts: 03/07 1901 - 03/09 0700  In: 26 [P.O.:460]  Out: 2225 [Urine:2225]    Physical Exam:     General:                    The patient is a pleasant elderly female in mild acute distress when speaking long sentences. Head:                                   Normocephalic/atraumatic. Eyes:                                   No palpebral pallor or scleral icterus. ENT:                                    External auricular and nasal exam within normal limits. Mucous membranes are moist.  Neck:                                   Supple, non-tender, no JVD. Lungs:                       Clear to auscultation bilaterally without wheezes or crackles. Decreased breath sounds at bases. No respiratory distress or accessory muscle use. Heart:                                  irregular rate and rhythm, without murmurs, rubs, or gallops. Abdomen:                  Soft, non-tender, mildly distended with normoactive bowel sounds.    Genitourinary:           No tenderness over the bladder or bilateral CVAs. Extremities:               Without clubbing, cyanosis, 1+ edema. Skin:                                    Normal color, texture, and turgor. No rashes, lesions, or jaundice. Pulses:                      Radial and dorsalis pedis pulses present 2+ bilaterally. Capillary refill <2s. Neurologic:                CN II-XII grossly intact and symmetrical.                                               Moving all four extremities well with no focal deficits. Psychiatric:                Pleasant demeanor, appropriate affect. Alert and oriented x 3      Lab/Data Review:    Recent Results (from the past 24 hour(s))   EKG, 12 LEAD, INITIAL    Collection Time: 03/08/19 10:39 AM   Result Value Ref Range    Ventricular Rate 55 BPM    Atrial Rate 49 BPM    QRS Duration 86 ms    Q-T Interval 458 ms    QTC Calculation (Bezet) 438 ms    Calculated R Axis 80 degrees    Calculated T Axis 67 degrees    Diagnosis       Atrial fibrillation with slow ventricular response with premature ventricular   or aberrantly conducted complexes  Anteroseptal infarct , age undetermined  Abnormal ECG  No previous ECGs available  Confirmed by [facility]  MD (), FARIDEH SQUIRES ([id number]) on 3/8/2019 1:52:15 PM     CBC WITH AUTOMATED DIFF    Collection Time: 03/08/19 10:44 AM   Result Value Ref Range    WBC 6.3 4.3 - 11.1 K/uL    RBC 4.37 4.05 - 5.2 M/uL    HGB 11.3 (L) 11.7 - 15.4 g/dL    HCT 37.2 35.8 - 46.3 %    MCV 85.1 79.6 - 97.8 FL    MCH 25.9 (L) 26.1 - 32.9 PG    MCHC 30.4 (L) 31.4 - 35.0 g/dL    RDW 18.3 (H) 11.9 - 14.6 %    PLATELET 079 126 - 023 K/uL    MPV 11.0 9.4 - 12.3 FL    ABSOLUTE NRBC 0.00 0.0 - 0.2 K/uL    DF AUTOMATED      NEUTROPHILS 64 43 - 78 %    LYMPHOCYTES 18 13 - 44 %    MONOCYTES 15 (H) 4.0 - 12.0 %    EOSINOPHILS 2 0.5 - 7.8 %    BASOPHILS 1 0.0 - 2.0 %    IMMATURE GRANULOCYTES 0 0.0 - 5.0 %    ABS. NEUTROPHILS 4.0 1.7 - 8.2 K/UL    ABS. LYMPHOCYTES 1.1 0.5 - 4.6 K/UL    ABS. MONOCYTES 0.9 0.1 - 1.3 K/UL    ABS. EOSINOPHILS 0.1 0.0 - 0.8 K/UL    ABS. BASOPHILS 0.1 0.0 - 0.2 K/UL    ABS. IMM. GRANS. 0.0 0.0 - 0.5 K/UL   METABOLIC PANEL, COMPREHENSIVE    Collection Time: 03/08/19 10:44 AM   Result Value Ref Range    Sodium 135 (L) 136 - 145 mmol/L    Potassium 3.5 3.5 - 5.1 mmol/L    Chloride 104 98 - 107 mmol/L    CO2 23 21 - 32 mmol/L    Anion gap 8 7 - 16 mmol/L    Glucose 112 (H) 65 - 100 mg/dL    BUN 30 (H) 8 - 23 MG/DL    Creatinine 1.21 (H) 0.6 - 1.0 MG/DL    GFR est AA 54 (L) >60 ml/min/1.73m2    GFR est non-AA 44 (L) >60 ml/min/1.73m2    Calcium 8.7 8.3 - 10.4 MG/DL    Bilirubin, total 0.5 0.2 - 1.1 MG/DL    ALT (SGPT) 8 (L) 12 - 65 U/L    AST (SGOT) 10 (L) 15 - 37 U/L    Alk.  phosphatase 98 50 - 136 U/L    Protein, total 8.0 6.3 - 8.2 g/dL    Albumin 3.6 3.2 - 4.6 g/dL    Globulin 4.4 (H) 2.3 - 3.5 g/dL    A-G Ratio 0.8 (L) 1.2 - 3.5     MAGNESIUM    Collection Time: 03/08/19 10:44 AM   Result Value Ref Range    Magnesium 2.3 1.8 - 2.4 mg/dL   TSH 3RD GENERATION    Collection Time: 03/08/19 10:44 AM   Result Value Ref Range    TSH 2.850 0.358 - 3.740 uIU/mL   BNP    Collection Time: 03/08/19 10:44 AM   Result Value Ref Range     (H) 0 pg/mL   PROTHROMBIN TIME + INR    Collection Time: 03/08/19 10:44 AM   Result Value Ref Range    Prothrombin time 13.7 11.7 - 14.5 sec    INR 1.1     PTT    Collection Time: 03/08/19 10:44 AM   Result Value Ref Range    aPTT 33.3 24.7 - 39.8 SEC   POC TROPONIN-I    Collection Time: 03/08/19 11:07 AM   Result Value Ref Range    Troponin-I (POC) 0.02 0.02 - 7.48 ng/ml   METABOLIC PANEL, COMPREHENSIVE    Collection Time: 03/09/19  3:27 AM   Result Value Ref Range    Sodium 137 136 - 145 mmol/L    Potassium 4.2 3.5 - 5.1 mmol/L    Chloride 104 98 - 107 mmol/L    CO2 24 21 - 32 mmol/L    Anion gap 9 7 - 16 mmol/L    Glucose 110 (H) 65 - 100 mg/dL    BUN 28 (H) 8 - 23 MG/DL    Creatinine 1.09 (H) 0.6 - 1.0 MG/DL    GFR est AA >60 >60 ml/min/1.73m2    GFR est non-AA 50 (L) >60 ml/min/1.73m2    Calcium 8.7 8.3 - 10.4 MG/DL    Bilirubin, total 0.6 0.2 - 1.1 MG/DL    ALT (SGPT) 11 (L) 12 - 65 U/L    AST (SGOT) 17 15 - 37 U/L    Alk. phosphatase 89 50 - 136 U/L    Protein, total 7.5 6.3 - 8.2 g/dL    Albumin 3.5 3.2 - 4.6 g/dL    Globulin 4.0 (H) 2.3 - 3.5 g/dL    A-G Ratio 0.9 (L) 1.2 - 3.5     CBC WITH AUTOMATED DIFF    Collection Time: 03/09/19  3:27 AM   Result Value Ref Range    WBC 7.4 4.3 - 11.1 K/uL    RBC 3.88 (L) 4.05 - 5.2 M/uL    HGB 10.1 (L) 11.7 - 15.4 g/dL    HCT 31.8 (L) 35.8 - 46.3 %    MCV 82.0 79.6 - 97.8 FL    MCH 26.0 (L) 26.1 - 32.9 PG    MCHC 31.8 31.4 - 35.0 g/dL    RDW 18.3 (H) 11.9 - 14.6 %    PLATELET 480 989 - 090 K/uL    MPV 10.5 9.4 - 12.3 FL    ABSOLUTE NRBC 0.00 0.0 - 0.2 K/uL    DF AUTOMATED      NEUTROPHILS 69 43 - 78 %    LYMPHOCYTES 14 13 - 44 %    MONOCYTES 16 (H) 4.0 - 12.0 %    EOSINOPHILS 0 (L) 0.5 - 7.8 %    BASOPHILS 1 0.0 - 2.0 %    IMMATURE GRANULOCYTES 0 0.0 - 5.0 %    ABS. NEUTROPHILS 5.1 1.7 - 8.2 K/UL    ABS. LYMPHOCYTES 1.0 0.5 - 4.6 K/UL    ABS. MONOCYTES 1.2 0.1 - 1.3 K/UL    ABS. EOSINOPHILS 0.0 0.0 - 0.8 K/UL    ABS. BASOPHILS 0.1 0.0 - 0.2 K/UL    ABS. IMM. GRANS. 0.0 0.0 - 0.5 K/UL   MAGNESIUM    Collection Time: 03/09/19  3:27 AM   Result Value Ref Range    Magnesium 2.0 1.8 - 2.4 mg/dL     XR chest   3-8-2019  IMPRESSION: Cardiomegaly and pulmonary edema. CHF exacerbation with small  pleural effusions. Echo   3-8-2019  SUMMARY:    -  Left ventricle: Systolic function was hyperdynamic. Ejection fraction was  estimated in the range of 65 % to 70 %. This study was inadequate for the  evaluation of regional wall motion. -  Left atrium: The atrium was markedly dilated. -  Right atrium: The atrium was markedly dilated. -  Inferior vena cava, hepatic veins: The inferior vena cava was mildly  dilated. The respirophasic change in diameter was less than 50%.     -  Mitral valve: There was mild annular calcification. There was moderate  thickening of the anterior leaflet. There was moderate regurgitation. -  Tricuspid valve: There was moderate regurgitation. I have reviewed chest x-ray and ECG myself.       Current Facility-Administered Medications:     furosemide (LASIX) tablet 40 mg, 40 mg, Oral, DAILY, HuddlestonEugene MD, 40 mg at 03/09/19 5027    spironolactone (ALDACTONE) tablet 25 mg, 25 mg, Oral, DAILY, Gay Romero MD, 25 mg at 03/09/19 2851    sodium chloride (NS) flush 5-40 mL, 5-40 mL, IntraVENous, Q8H, Rojo, Inocencio Situ, DO, 5 mL at 03/09/19 0601    sodium chloride (NS) flush 5-40 mL, 5-40 mL, IntraVENous, PRN, Rojo, Inocencio Situ, DO    acetaminophen (TYLENOL) tablet 650 mg, 650 mg, Oral, Q4H PRN, Hannah Paredes, DO, 650 mg at 03/09/19 0013    HYDROcodone-acetaminophen (NORCO) 5-325 mg per tablet 1 Tab, 1 Tab, Oral, Q4H PRN, Rojo, Inocencio Situ, DO    naloxone [facility]) injection 0.4 mg, 0.4 mg, IntraVENous, PRN, Rojo, Inocencio Situ, DO    bisacodyl (DULCOLAX) suppository 10 mg, 10 mg, Rectal, DAILY PRN, Rojo, Inocencio Situ, DO    enoxaparin (LOVENOX) injection 30 mg, 30 mg, SubCUTAneous, Q24H, Rojo, Inocencio Situ, DO, Stopped at 03/08/19 1400    lisinopril (PRINIVIL, ZESTRIL) tablet 20 mg, 20 mg, Oral, BID, Rojo, Inocencio Situ, DO, 20 mg at 03/09/19 1536    atorvastatin (LIPITOR) tablet 40 mg, 40 mg, Oral, DAILY, Rojo, Inocencio Situ, DO, 40 mg at 03/09/19 0920    aspirin delayed-release tablet 81 mg, 81 mg, Oral, DAILY, Rojo, Inocencio Situ, DO, 81 mg at 03/09/19 6242    hydrALAZINE (APRESOLINE) 20 mg/mL injection 10 mg, 10 mg, IntraVENous, Q8H PRN, Inocencio Rojo Situ, DO, 10 mg at 03/08/19 1419    amLODIPine (NORVASC) tablet 5 mg, 5 mg, Oral, DAILY, Elena RINCON, NP, 5 mg at 03/09/19 5805    lip protectant (BLISTEX) ointment, , Topical, PRN, Gay Romero MD    benzonatate (TESSALON) capsule 100 mg, 100 mg, Oral, TID PRN, Shaye Souza, Cici Sabillon MD, 100 mg at 03/09/19 1262        Assessment:     Principal Problem:    CHF (congestive heart failure) ([facility] 75.) (3/8/2019)    Active Problems:    Mixed hyperlipidemia (1/22/2013)      Hypertension (1/22/2013)      Chronic [facility] fibrillation ([facility] (1/22/2013)      Overview: Refuses intervention, anticoagulation etc        Plan:     Acute diastolic CHF with pulmonary edema. Improved with diuretic. Continue this. Bradycardia is resolved after discontinuation of beta blocker. On PO Lasix and Spironolactone is added. EF is preserved. Stop potassium supplement. Check BMP in am     Hypertension  Monitor blood pressure and manage accordingly. Continue home medications. Hyperlipidemia  Continue home medications. I have discussed the plan of care with patient and family member, grandson,  at bedside. DVT prophylaxis : Lovenox SC     I have discussed with patient regarding advance directive. Patient would like to have a DNR status.      Signed By: Edith Briones MD     March 9, 2019

## 2019-03-09 NOTE — PROGRESS NOTES
Bedside and Verbal shift change report received from Deniz Guajardo, [facility]. Report included the following information SBAR, Kardex, ED Summary, Procedure Summary, Intake/Output, MAR, Recent Results and Cardiac Rhythm A FIB.

## 2019-03-09 NOTE — PROGRESS NOTES
[facility] CARDIOLOGY PROGRESS NOTE           3/9/2019 8:46 AM    Admit Date: 3/8/2019      Subjective:   Feeling better. Objective:      Vitals:    03/08/19 2053 03/09/19 0046 03/09/19 0457 03/09/19 0608   BP: 155/51 154/68 168/70 158/54   Pulse: 80 66 80 81   Resp: 18 18 18    Temp: 97.7 °F (36.5 °C) 98.4 °F (36.9 °C) 99 °F (37.2 °C)    SpO2: 94% 94% 94%    Weight:   70.2 kg (154 lb 12.8 oz)    Height:           Physical Exam:  General-No Acute Distress  Neck- supple, no JVD  CV- irregular rate and rhythm no MRG  Lung- clear bilaterally  Abd- soft, nontender, nondistended  Ext- no edema bilaterally. Skin- warm and dry    Data Review:   Recent Labs     03/09/19  0327 03/08/19  1044    135*   K 4.2 3.5   MG 2.0 2.3   BUN 28* 30*   CREA 1.09* 1.21*   * 112*   WBC 7.4 6.3   HGB 10.1* 11.3*   HCT 31.8* 37.2    220   INR  --  1.1       Assessment/Plan:     HFpEF      Chronic atrial fibrillation (HCC) (1/22/2013)      Bradycardia    Anemia    ///    [place] Resides resolved off metoprlol  Change loop to po, add spironolactone, stop KCL.           Beatriz Purdy MD  3/9/2019 8:46 AM

## 2019-03-09 NOTE — PROGRESS NOTES
Verbal bedside report given to tree Garcia RN. Patient's situation, background, assessment and recommendations provided. Opportunity for questions provided. Oncoming RN assumed care of patient.

## 2019-03-09 NOTE — PROGRESS NOTES
Bedside and Verbal shift change report given to EILEEN Cornelius (oncoming nurse) by self (offgoing nurse). Report included the following information SBAR, Kardex, MAR and Recent Results.

## 2019-03-10 LAB
ANION GAP SERPL CALC-SCNC: 8 MMOL/L (ref 7–16)
BUN SERPL-MCNC: 21 MG/DL (ref 8–23)
CALCIUM SERPL-MCNC: 8.4 MG/DL (ref 8.3–10.4)
CHLORIDE SERPL-SCNC: 102 MMOL/L (ref 98–107)
CO2 SERPL-SCNC: 27 MMOL/L (ref 21–32)
CREAT SERPL-MCNC: 1.03 MG/DL (ref 0.6–1)
GLUCOSE SERPL-MCNC: 90 MG/DL (ref 65–100)
POTASSIUM SERPL-SCNC: 4.5 MMOL/L (ref 3.5–5.1)
SODIUM SERPL-SCNC: 137 MMOL/L (ref 136–145)

## 2019-03-10 PROCEDURE — 74011250637 HC RX REV CODE- 250/637: Performed by: NURSE PRACTITIONER

## 2019-03-10 PROCEDURE — 74011250637 HC RX REV CODE- 250/637: Performed by: INTERNAL MEDICINE

## 2019-03-10 PROCEDURE — 74011250636 HC RX REV CODE- 250/636: Performed by: INTERNAL MEDICINE

## 2019-03-10 PROCEDURE — 74011250637 HC RX REV CODE- 250/637: Performed by: FAMILY MEDICINE

## 2019-03-10 PROCEDURE — 36415 COLL VENOUS BLD VENIPUNCTURE: CPT

## 2019-03-10 PROCEDURE — 99218 HC RM OBSERVATION: CPT

## 2019-03-10 PROCEDURE — 80048 BASIC METABOLIC PNL TOTAL CA: CPT

## 2019-03-10 RX ORDER — BISACODYL 5 MG
5 TABLET, DELAYED RELEASE (ENTERIC COATED) ORAL DAILY PRN
Status: DISCONTINUED | OUTPATIENT
Start: 2019-03-10 | End: 2019-03-14 | Stop reason: HOSPADM

## 2019-03-10 RX ADMIN — Medication 5 ML: at 21:22

## 2019-03-10 RX ADMIN — FUROSEMIDE 40 MG: 40 TABLET ORAL at 09:21

## 2019-03-10 RX ADMIN — BENZONATATE 100 MG: 100 CAPSULE ORAL at 20:03

## 2019-03-10 RX ADMIN — Medication 5 ML: at 14:50

## 2019-03-10 RX ADMIN — ATORVASTATIN CALCIUM 40 MG: 40 TABLET, FILM COATED ORAL at 09:22

## 2019-03-10 RX ADMIN — Medication 10 ML: at 04:27

## 2019-03-10 RX ADMIN — ENOXAPARIN SODIUM 30 MG: 30 INJECTION, SOLUTION INTRAVENOUS; SUBCUTANEOUS at 14:49

## 2019-03-10 RX ADMIN — BENZONATATE 100 MG: 100 CAPSULE ORAL at 09:22

## 2019-03-10 RX ADMIN — ASPIRIN 81 MG: 81 TABLET, COATED ORAL at 09:22

## 2019-03-10 RX ADMIN — LISINOPRIL 20 MG: 20 TABLET ORAL at 09:21

## 2019-03-10 RX ADMIN — BISACODYL 5 MG: 5 TABLET, COATED ORAL at 10:14

## 2019-03-10 RX ADMIN — SPIRONOLACTONE 25 MG: 25 TABLET ORAL at 09:23

## 2019-03-10 RX ADMIN — AMLODIPINE BESYLATE 5 MG: 5 TABLET ORAL at 09:22

## 2019-03-10 RX ADMIN — ACETAMINOPHEN 650 MG: 325 TABLET, FILM COATED ORAL at 20:02

## 2019-03-10 RX ADMIN — LISINOPRIL 20 MG: 20 TABLET ORAL at 18:15

## 2019-03-10 NOTE — PROGRESS NOTES
Bedside and Verbal [facility] Report included the following information SBAR, Kardex, Intake/Output, MAR, Recent Results and Cardiac Rhythm A FIB.

## 2019-03-10 NOTE — PROGRESS NOTES
[facility] CARDIOLOGY PROGRESS NOTE           3/10/2019 9:47 AM    Admit Date: 3/8/2019      Subjective:   Doing well. No cp or sob      Objective:      Vitals:    03/10/19 0050 03/10/19 0423 03/10/19 0449 03/10/19 0828   BP: 149/70  124/63 139/54   Pulse: 66  61 77   Resp: 16  16 18   Temp: 99.1 °F (37.3 °C)  98 °F (36.7 °C) 97.6 °F (36.4 °C)   SpO2: 94%  93% 97%   Weight:  69.9 kg (154 lb)     Height:           Physical Exam:  General-No Acute Distress  Neck- supple, no JVD  CV- irregular rate and rhythm no MRG  Lung- clear bilaterally  Abd- soft, nontender, nondistended  Ext- no edema bilaterally. Skin- warm and dry    Data Review:   Recent Labs     03/10/19  0411 03/09/19  0327 03/08/19  1044    137 135*   K 4.5 4.2 3.5   MG  --  2.0 2.3   BUN 21 28* 30*   CREA 1.03* 1.09* 1.21*   GLU 90 110* 112*   WBC  --  7.4 6.3   HGB  --  10.1* 11.3*   HCT  --  31.8* 37.2   PLT  --  202 220   INR  --   --  1.1       Assessment/Plan:     HFpEF      Chronic atrial fibrillation (HCC) (1/22/2013)      Bradycardia     Anemia    ////    Doing well. No new recommendation.    Needs a fu visit and BMP 1 week  On standby        Hedy Massey MD  3/10/2019 9:47 AM

## 2019-03-10 NOTE — PROGRESS NOTES
Progress Note    Patient: Satish Madera MRN: [id number]  SSN: [id number]    YOB: 1926  Age: 80 y.o. Sex: female      Admit Date: 3/8/2019    LOS: 0 days     Subjective:     PMH of AF, hypertension. Admitted due to weight gain, edema, shortness of breath, cough. Found to have bradycardia, CHF, CLAUDE. Patient says she is feeling better. No fever. Less swelling. She is able to ambulate to the bedside commode with help. Objective:     Vitals:    03/09/19 2055 03/10/19 0050 03/10/19 0423 03/10/19 0449   BP: 159/68 149/70  124/63   Pulse: 81 66  61   Resp: 16 16  16   Temp: 98.6 °F (37 °C) 99.1 °F (37.3 °C)  98 °F (36.7 °C)   SpO2: 96% 94%  93%   Weight:   69.9 kg (154 lb)    Height:            Intake and Output:  Current Shift: No intake/output data recorded. Last three shifts: 03/08 1901 - 03/10 0700  In: 1300 [P.O.:1300]  Out: 2000 [Urine:2000]    Physical Exam:     General:                    The patient is a pleasant elderly female in no acute distress. She is lying flat in bed. Head:                                   Normocephalic/atraumatic. Eyes:                                   No palpebral pallor or scleral icterus. ENT:                                    External auricular and nasal exam within normal limits. Mucous membranes are moist.  Neck:                                   Supple, non-tender, no JVD. Lungs:                       Clear to auscultation bilaterally without wheezes or crackles. Decreased breath sounds at bases. No respiratory distress or accessory muscle use. Heart:                                  irregular rate and rhythm, without murmurs, rubs, or gallops. Abdomen:                  Soft, non-tender, mildly distended with normoactive bowel sounds. Genitourinary:           No tenderness over the bladder or bilateral CVAs.   Extremities: Without clubbing, cyanosis, mild edema. Skin:                                    Normal color, texture, and turgor. No rashes, lesions, or jaundice. Pulses:                      Radial and dorsalis pedis pulses present 2+ bilaterally. Capillary refill <2s. Neurologic:                CN II-XII grossly intact and symmetrical.                                               Moving all four extremities well with no focal deficits. Psychiatric:                Pleasant demeanor, appropriate affect. Alert and oriented x 3      Lab/Data Review:    Recent Results (from the past 24 hour(s))   METABOLIC PANEL, BASIC    Collection Time: 03/10/19  4:11 AM   Result Value Ref Range    Sodium 137 136 - 145 mmol/L    Potassium 4.5 3.5 - 5.1 mmol/L    Chloride 102 98 - 107 mmol/L    CO2 27 21 - 32 mmol/L    Anion gap 8 7 - 16 mmol/L    Glucose 90 65 - 100 mg/dL    BUN 21 8 - 23 MG/DL    Creatinine 1.03 (H) 0.6 - 1.0 MG/DL    GFR est AA >60 >60 ml/min/1.73m2    GFR est non-AA 53 (L) >60 ml/min/1.73m2    Calcium 8.4 8.3 - 10.4 MG/DL     XR chest   3-8-2019  IMPRESSION: Cardiomegaly and pulmonary edema. CHF exacerbation with small  pleural effusions. Echo   3-8-2019  SUMMARY:    -  Left ventricle: Systolic function was hyperdynamic. Ejection fraction was  estimated in the range of 65 % to 70 %. This study was inadequate for the  evaluation of regional wall motion. -  Left atrium: The atrium was markedly dilated. -  Right atrium: The atrium was markedly dilated. -  Inferior vena cava, hepatic veins: The inferior vena cava was mildly  dilated. The respirophasic change in diameter was less than 50%. -  Mitral valve: There was mild annular calcification. There was moderate  thickening of the anterior leaflet. There was moderate regurgitation. -  Tricuspid valve: There was moderate regurgitation. I have reviewed chest x-ray and ECG myself.       Current Facility-Administered Medications:     furosemide (LASIX) tablet 40 mg, 40 mg, Oral, DAILY, [facility], Dwaine Dowd MD, 40 mg at 03/09/19 6363    spironolactone (ALDACTONE) tablet 25 mg, 25 mg, Oral, DAILY, Ralph Sullivan MD, 25 mg at 03/09/19 2294    sodium chloride (NS) flush 5-40 mL, 5-40 mL, IntraVENous, Q8H, Lala Rojo DO, 10 mL at 03/10/19 0427    sodium chloride (NS) flush 5-40 mL, 5-40 mL, IntraVENous, PRN, Lala Rojo DO    acetaminophen (TYLENOL) tablet 650 mg, 650 mg, Oral, Q4H PRN, Renard Dias DO, 650 mg at 03/09/19 2119    HYDROcodone-acetaminophen (NORCO) 5-325 mg per tablet 1 Tab, 1 Tab, Oral, Q4H PRN, Lala Rojo DO    naloxone [facility]) injection 0.4 mg, 0.4 mg, IntraVENous, PRN, Lala Rojo DO    bisacodyl (DULCOLAX) suppository 10 mg, 10 mg, Rectal, DAILY PRN, Lala Rojo DO    enoxaparin (LOVENOX) injection 30 mg, 30 mg, SubCUTAneous, Q24H, Lala Rojo DO, 30 mg at 03/09/19 1423    lisinopril (PRINIVIL, ZESTRIL) tablet 20 mg, 20 mg, Oral, BID, Lala Rojo DO, 20 mg at 03/09/19 1806    atorvastatin (LIPITOR) tablet 40 mg, 40 mg, Oral, DAILY, Lala Rojo DO, 40 mg at 03/09/19 0920    aspirin delayed-release tablet 81 mg, 81 mg, Oral, DAILY, Lala Rojo DO, 81 mg at 03/09/19 5957    hydrALAZINE (APRESOLINE) 20 mg/mL injection 10 mg, 10 mg, IntraVENous, Q8H PRN, Lala Rojo DO, 10 mg at 03/08/19 1419    amLODIPine (NORVASC) tablet 5 mg, 5 mg, Oral, DAILY, Stephanie Sharma D, NP, 5 mg at 03/09/19 1023    lip protectant (BLISTEX) ointment, , Topical, PRN, Ralph Sullivan MD    benzonatate (TESSALON) capsule 100 mg, 100 mg, Oral, TID PRN, Ro Phillip MD, 100 mg at 03/09/19 1422        Assessment:     Principal Problem:    CHF (congestive heart failure) ([facility] 75.) (3/8/2019)    Active Problems:    Mixed hyperlipidemia (1/22/2013)      Hypertension (1/22/2013)      Chronic atrial fibrillation ([facility] 75.) (1/22/2013) Overview: Refuses intervention, anticoagulation etc        Plan:     Acute diastolic CHF with pulmonary edema. Improved with diuretic. Continue PO Lasix. Bradycardia is resolved after discontinuation of beta blocker. Spironolactone is added. EF is preserved. Stopped potassium supplement. BMP is checked today. Renal function is holding up. K is normal.   Check chest x-ray in AM.     Hypertension  Monitor blood pressure and manage accordingly. Continue home medications. Hyperlipidemia  Continue home medications. I have discussed the plan of care with patient and family member at bedside. DVT prophylaxis : Lovenox SC     I have discussed with patient regarding advance directive. Patient would like to have a DNR status. Physical therapy for strength and ambulation.  to help with discharge planning.      Signed By: Lorie Spivey MD     March 10, 2019

## 2019-03-11 ENCOUNTER — APPOINTMENT (OUTPATIENT)
Dept: GENERAL RADIOLOGY | Age: 84
DRG: 291 | End: 2019-03-11
Attending: INTERNAL MEDICINE
Payer: MEDICARE

## 2019-03-11 PROCEDURE — 99218 HC RM OBSERVATION: CPT

## 2019-03-11 PROCEDURE — 86580 TB INTRADERMAL TEST: CPT | Performed by: INTERNAL MEDICINE

## 2019-03-11 PROCEDURE — 97530 THERAPEUTIC ACTIVITIES: CPT

## 2019-03-11 PROCEDURE — 74011250637 HC RX REV CODE- 250/637: Performed by: FAMILY MEDICINE

## 2019-03-11 PROCEDURE — 97162 PT EVAL MOD COMPLEX 30 MIN: CPT

## 2019-03-11 PROCEDURE — 74011250637 HC RX REV CODE- 250/637: Performed by: INTERNAL MEDICINE

## 2019-03-11 PROCEDURE — 65660000000 HC RM CCU STEPDOWN

## 2019-03-11 PROCEDURE — 74011250636 HC RX REV CODE- 250/636: Performed by: INTERNAL MEDICINE

## 2019-03-11 PROCEDURE — 71045 X-RAY EXAM CHEST 1 VIEW: CPT

## 2019-03-11 PROCEDURE — 74011250637 HC RX REV CODE- 250/637: Performed by: NURSE PRACTITIONER

## 2019-03-11 PROCEDURE — 74011000302 HC RX REV CODE- 302: Performed by: INTERNAL MEDICINE

## 2019-03-11 RX ADMIN — BENZONATATE 100 MG: 100 CAPSULE ORAL at 22:33

## 2019-03-11 RX ADMIN — ATORVASTATIN CALCIUM 40 MG: 40 TABLET, FILM COATED ORAL at 09:23

## 2019-03-11 RX ADMIN — Medication 5 ML: at 22:22

## 2019-03-11 RX ADMIN — SPIRONOLACTONE 25 MG: 25 TABLET ORAL at 09:23

## 2019-03-11 RX ADMIN — FUROSEMIDE 40 MG: 40 TABLET ORAL at 09:23

## 2019-03-11 RX ADMIN — AMLODIPINE BESYLATE 5 MG: 5 TABLET ORAL at 09:23

## 2019-03-11 RX ADMIN — TUBERCULIN PURIFIED PROTEIN DERIVATIVE 5 UNITS: 5 INJECTION, SOLUTION INTRADERMAL at 14:34

## 2019-03-11 RX ADMIN — LISINOPRIL 20 MG: 20 TABLET ORAL at 09:22

## 2019-03-11 RX ADMIN — ACETAMINOPHEN 650 MG: 325 TABLET, FILM COATED ORAL at 22:21

## 2019-03-11 RX ADMIN — LISINOPRIL 20 MG: 20 TABLET ORAL at 17:38

## 2019-03-11 RX ADMIN — Medication 10 ML: at 14:35

## 2019-03-11 RX ADMIN — BENZONATATE 100 MG: 100 CAPSULE ORAL at 02:50

## 2019-03-11 RX ADMIN — Medication 5 ML: at 05:12

## 2019-03-11 RX ADMIN — ENOXAPARIN SODIUM 30 MG: 30 INJECTION, SOLUTION INTRAVENOUS; SUBCUTANEOUS at 14:33

## 2019-03-11 RX ADMIN — ASPIRIN 81 MG: 81 TABLET, COATED ORAL at 09:23

## 2019-03-11 RX ADMIN — BENZONATATE 100 MG: 100 CAPSULE ORAL at 17:40

## 2019-03-11 NOTE — DISCHARGE INSTRUCTIONS
Patient Education        Learning About ACE Inhibitors  Introduction    ACE (angiotensin-converting enzyme) inhibitors stop the release of an enzyme. This enzyme makes your blood vessels smaller. Without it, your blood vessels relax and get bigger. This lowers your blood pressure. These medicines also increase how much water and salt go into your urine. This also lowers blood pressure. You may take this kind of medicine if you have high blood pressure. Or you may take it if you have heart problems, kidney problems, or diabetes. If you have coronary artery disease, this medicine can help prevent heart attacks and strokes. Examples  · Benazepril (Lotensin)  · Lisinopril (Prinivil, Zestril)  · Ramipril (Altace)  This is not a complete list.  Possible side effects  Side effects may include:  · A cough. · Low blood pressure. This can make you feel dizzy or weak. · Too much potassium in your body. · Swelling. This may be a sign of an allergic reaction. You may have other side effects or reactions not listed here. Check the information that comes with your medicine. What to know about taking this medicine  · ACE inhibitors can cause a dry cough. Talk to your doctor if you have a dry cough. You may need a different medicine. · These medicines can cause an allergic reaction. This can cause a little swelling. Or it can cause red bumps on your skin that hurt. In rare cases, the swelling may make it hard for you to breathe. · Do not take this medicine if you are pregnant. And don't take it if you plan to become pregnant. · Take your medicines exactly as prescribed. Call your doctor if you think you are having a problem with your medicine. · Check with your doctor or pharmacist before you use any other medicines. This includes over-the-counter medicines. Make sure your doctor knows all of the medicines, vitamins, herbal products, and supplements you take. Taking some medicines together can cause problems.   · You may need regular blood tests. Where can you learn more? Go to http://ricky-rigo.info/. Enter P050 in the search box to learn more about \"Learning About ACE Inhibitors. \"  Current as of: July 22, 2018  Content Version: 11.9  © 3751-3895 [employer]. Care instructions adapted under license by [employer] (which disclaims liability or warranty for this information). If you have questions about a medical condition or this instruction, always ask your healthcare professional. [address] any warranty or liability for your use of this information.

## 2019-03-11 NOTE — PROGRESS NOTES
Progress Note    Patient: Gwendolyn Pacheco MRN: [id number]  SSN: [id number]    YOB: 1926  Age: 80 y.o. Sex: female      Admit Date: 3/8/2019    LOS: 0 days     Subjective:     PMH of AF, hypertension. Admitted due to weight gain, edema, shortness of breath, cough. Found to have bradycardia, CHF, CLAUDE. Patient states that although she feels better compared to the time of admission, she feels weak and could not ambulate much without help. She lives at home alone before admission and was functioning reasonably well. Objective:     Vitals:    03/11/19 0435 03/11/19 0447 03/11/19 0922 03/11/19 0946   BP:  143/59 169/61 118/54   Pulse:  68 70 (!) 54   Resp:  18  18   Temp:  97.7 °F (36.5 °C)  98.4 °F (36.9 °C)   SpO2:  95%  94%   Weight: 69.8 kg (153 lb 12.8 oz)      Height: 5' 3\" (1.6 m)           Intake and Output:  Current Shift: 03/11 0701 - 03/11 1900  In: 240 [P.O.:240]  Out: 500 [Urine:500]  Last three shifts: 03/09 1901 - 03/11 0700  In: 1610 [P.O.:1610]  Out: 1750 [Urine:1750]    Physical Exam:     General:                    The patient is a pleasant elderly female in no acute distress. She is sitting up in bed. Head:                                   Normocephalic/atraumatic. Eyes:                                   No palpebral pallor or scleral icterus. ENT:                                    External auricular and nasal exam within normal limits. Mucous membranes are moist.  Neck:                                   Supple, non-tender, no JVD. Lungs:                       Clear to auscultation bilaterally without wheezes or crackles. Decreased breath sounds at bases. No respiratory distress or accessory muscle use. Heart:                                  irregular rate and rhythm, without murmurs, rubs, or gallops.   Abdomen:                  Soft, non-tender, mildly distended with normoactive bowel sounds. Genitourinary:           No tenderness over the bladder or bilateral CVAs. Extremities:               Without clubbing, cyanosis, mild edema. Skin:                                    Normal color, texture, and turgor. No rashes, lesions, or jaundice. Pulses:                      Radial and dorsalis pedis pulses present 2+ bilaterally. Capillary refill <2s. Neurologic:                CN II-XII grossly intact and symmetrical.                                               Moving all four extremities well with no focal deficits. Psychiatric:                Pleasant demeanor, appropriate affect. Alert and oriented x 3      Lab/Data Review:    XR chest   3-8-2019  IMPRESSION: Cardiomegaly and pulmonary edema. CHF exacerbation with small  pleural effusions. Echo   3-8-2019  SUMMARY:    -  Left ventricle: Systolic function was hyperdynamic. Ejection fraction was  estimated in the range of 65 % to 70 %. This study was inadequate for the  evaluation of regional wall motion. -  Left atrium: The atrium was markedly dilated. -  Right atrium: The atrium was markedly dilated. -  Inferior vena cava, hepatic veins: The inferior vena cava was mildly  dilated. The respirophasic change in diameter was less than 50%. -  Mitral valve: There was mild annular calcification. There was moderate  thickening of the anterior leaflet. There was moderate regurgitation. -  Tricuspid valve: There was moderate regurgitation. I have reviewed chest x-ray and ECG myself.       Current Facility-Administered Medications:     tuberculin injection 5 Units, 5 Units, IntraDERMal, ONCE, Faizan Rojo,     bisacodyl (DULCOLAX) tablet 5 mg, 5 mg, Oral, DAILY PRN, Linus Cruz MD, 5 mg at 03/10/19 1014    furosemide (LASIX) tablet 40 mg, 40 mg, Oral, DAILY, Amador Snellen, MD, 40 mg at 03/11/19 1227    spironolactone (ALDACTONE) tablet 25 mg, 25 mg, Oral, DAILY, Ernst Elkins MD, 25 mg at 03/11/19 6695    sodium chloride (NS) flush 5-40 mL, 5-40 mL, IntraVENous, Q8H, Aditi Rojo DO, 5 mL at 03/11/19 8793    sodium chloride (NS) flush 5-40 mL, 5-40 mL, IntraVENous, PRN, Aditi Rojo DO    acetaminophen (TYLENOL) tablet 650 mg, 650 mg, Oral, Q4H PRN, Valeria Lissa, DO, 650 mg at 03/10/19 2002    HYDROcodone-acetaminophen (NORCO) 5-325 mg per tablet 1 Tab, 1 Tab, Oral, Q4H PRN, Aditi Rojo DO    naloxone [facility]) injection 0.4 mg, 0.4 mg, IntraVENous, PRN, Aditi Rojo DO    enoxaparin (LOVENOX) injection 30 mg, 30 mg, SubCUTAneous, Q24H, Aditi Rojo DO, 30 mg at 03/10/19 1449    lisinopril (PRINIVIL, ZESTRIL) tablet 20 mg, 20 mg, Oral, BID, Aditi Rojo DO, 20 mg at 03/11/19 6220    atorvastatin (LIPITOR) tablet 40 mg, 40 mg, Oral, DAILY, Aditi Rojo DO, 40 mg at 03/11/19 1195    aspirin delayed-release tablet 81 mg, 81 mg, Oral, DAILY, Aditi Rojo DO, 81 mg at 03/11/19 4663    hydrALAZINE (APRESOLINE) 20 mg/mL injection 10 mg, 10 mg, IntraVENous, Q8H PRN, Aditi Rojo DO, 10 mg at 03/08/19 1419    amLODIPine (NORVASC) tablet 5 mg, 5 mg, Oral, DAILY, Elle RINCON NP, 5 mg at 03/11/19 7422    lip protectant (BLISTEX) ointment, , Topical, PRN, Ernst Elkins MD    benzonatate (TESSALON) capsule 100 mg, 100 mg, Oral, TID PRN, Alfredo Sanches MD, 100 mg at 03/11/19 0250        Assessment:     Principal Problem:    CHF (congestive heart failure) ([facility] (3/8/2019)    Active Problems:    Mixed hyperlipidemia (1/22/2013)      Hypertension (1/22/2013)      Chronic atrial fibrillation ([facility] (1/22/2013)      Overview: Refuses intervention, anticoagulation etc        Plan:     Acute diastolic CHF with pulmonary edema. Improved with diuretic. Continue PO Lasix. Bradycardia is resolved after discontinuation of beta blocker. Spironolactone is added.  EF is preserved. Stopped potassium supplement. Repeated CXR shows improvement of her pulmonary edema. Hypertension  Monitor blood pressure and manage accordingly. Continue home medications. Hyperlipidemia  Continue home medications. I have discussed the plan of care with patient and family member at bedside. DVT prophylaxis : Lovenox [place]     I have discussed with patient regarding advance directive. Patient would like to have a DNR status. Physical therapy for strength and ambulation. Family is considering placement to short-term rehabilitation facility.      Signed By: Nyla Machado MD     March 11, 2019

## 2019-03-11 NOTE — PROGRESS NOTES
Bedside and Verbal shift change report given to self (oncoming nurse) by Tereza Hdz RN (offgoing nurse). Report included the following information SBAR, Kardex and MAR. Scabs noted on L buttocks, no open areas visualized, MD Radha Hoffmann called to bedside to assess bc patient states history of shingles in January.

## 2019-03-11 NOTE — ROUTINE PROCESS
CHF teaching started post introduction to pt/family; aware of diagnosis. Planner @ BS and will follow. Smoking/ ETOH/Illicit drug use cessation and maintain a healthy weight covered. Pt/family aware that I can not prescribe nor adjust  medications: 15mins  Palliative Care score:  RATT 12, none  Refused ACP on admission  Start 2L/D Fluid restriction/ cardiac diet  CHF teaching continues to pt/family. Emphasis on taking prescription meds as ordered, to keep F/U appts and to call MD STAT if any of the following occur:   If you gain 2 lbs in one day or 5 lbs in a week, and short of breath.  If you can not lay flat without developing short of breath or rapid breathing at night; or if it wakes you up. Develop a cough or wheezing.

## 2019-03-11 NOTE — PROGRESS NOTES
Received call from Dr. Tamara Soria that patient was changed to inpatient admission. CM spoke with daughter in law and patient about status upgrade and signed IM letter. Patient leaning toward SNF prior to d/c but will discuss with son when he arrives. Provided patient list of SNF's available and will follow up with determination.

## 2019-03-11 NOTE — PROGRESS NOTES
Problem: Mobility Impaired (Adult and Pediatric)  Goal: *Acute Goals and Plan of Care (Insert Text)  STG:  (1.)Ms. House will move from supine to sit and sit to supine , scoot up and down and roll side to side with SUPERVISION within 3 treatment day(s). (2.)Ms. House will transfer from bed to chair and chair to bed with STAND BY ASSIST using the least restrictive device within 3 treatment day(s). (3.)Ms. House will ambulate with STAND BY ASSIST for 150 feet with the least restrictive device within  treatment day(s). (4.)Ms. House will perform standing static and dynamic balance activities x 15 minutes with STAND BY ASSIST to improve safety within 3 day(s). (5.)Ms. House will perform LE exercises with 1 to 2 cues for form within 3 days to improve strength for functional transfers and ambulation. LTG:  (1.)Ms. House will move from supine to sit and sit to supine , scoot up and down and roll side to side in bed with MODIFIED INDEPENDENCE within 7 treatment day(s). (2.)Ms. House will transfer from bed to chair and chair to bed with MODIFIED INDEPENDENCE using the least restrictive device within 7 treatment day(s). (3.)Ms. House will ambulate with MODIFIED INDEPENDENCE for 250+ feet with the least restrictive device within 7 treatment day(s). (4.)Ms. House will perform standing static and dynamic balance activities x 15 minutes with MODIFIED INDEPENDENCE to improve safety within 7 day(s).   ________________________________________________________________________________________________      PHYSICAL THERAPY: Initial Assessment, Daily Note and AM 3/11/2019  INPATIENT: PT Visit Days : 1  [place] / [place] / Product Type: Medicare /       NAME/AGE/GENDER: Sunny Mora is a 80 y.o. female   PRIMARY DIAGNOSIS: CHF (congestive heart failure) (HCC) [I50.9]  CHF (congestive heart failure) (HCC) [I50.9] CHF (congestive heart failure) ([facility] CHF (congestive heart failure) ([facility]       ICD-10: Treatment Diagnosis:    · Difficulty in walking, Not elsewhere classified (R26.2)   Precaution/Allergies:  Patient has no known allergies. ASSESSMENT:     Ms. Mook Lawton is a 80 y.o. female admitted for CHF. She lives alone in a single story home and typically ambulates modified independently with rollator walker and performs ADLs without assistance. She has a raised toilet seat at home and reports 0 falls in the past 6 months. Family members drive her and manage her medications. She is supine on contact and agreeable to physical therapy evaluation and treatment. She required stand by assist to transfer to edge of bed with flat bed, exhibits 99% SpO2 on room air and stood with minimal assist from edge of bed. Ambulated to bedside commode 10 to void and able to perform lakeisha care without assist, although required CGA for standing balance during this activity. Treatment initiated to include sit<>stand transfers, which she improved to contact guard assist, ambulation 100' with rolling walker in hallway with contact guard assist to stand by assist with 0 losses of balance and slow gait speed. Verbal cues provided for posture as patient exhibited flexed posture during gait. She returned to room to continue transfers to chair and standing balance activities with managing rolling walker in narrow spaces. SpO2 98% on room air post ambulation. Thaddeus Chavez is currently functioning below her baseline and would benefit from skilled PT during acute care stay to maximize safety and independence with functional mobility. This section established at most recent assessment   PROBLEM LIST (Impairments causing functional limitations):  1. Decreased Strength  2. Decreased ADL/Functional Activities  3. Decreased Transfer Abilities  4. Decreased Ambulation Ability/Technique  5. Decreased Balance  6. Decreased Activity Tolerance  7. Decreased Pacing Skills  8.  Decreased Knowledge of Precautions  9. Decreased [place] with Home Exercise Program   INTERVENTIONS PLANNED: (Benefits and precautions of physical therapy have been discussed with the patient.)  1. Balance Exercise  2. Bed Mobility  3. Family Education  4. Gait Training  5. Group Therapy  6. Home Exercise Program (HEP)  7. Therapeutic Activites  8. Therapeutic Exercise/Strengthening  9. Transfer Training     TREATMENT PLAN: Frequency/Duration: 3 times a week for duration of hospital stay  Rehabilitation Potential For Stated Goals: Good     RECOMMENDED REHABILITATION/EQUIPMENT: (at time of discharge pending progress): Due to the probability of continued deficits (see above) this patient will likely need continued skilled physical therapy after discharge. Equipment:    Bedside Commode              HISTORY:   History of Present Injury/Illness (Reason for Referral):  Patient with atrial fibrillation and high blood pressure. On amlodipine, Lotensin and metoprolol 50 mg. Went to primary care doctor's office with worsening shortness of breath and bilateral lower extremity edema along with a 10 pound weight gain.  Has had a cough for the past week.  On his evaluation noted, heart rate 34.  On EKG.  Sent here for further evaluation.        The history is provided by the patient and medical records. No  was used.   Slow Heart Rate    This is a new problem. The current episode started less than 1 hour ago. The problem has not changed since onset. The problem occurs constantly. The pain is associated with normal activity. The patient is experiencing no pain. Associated symptoms include lower extremity edema, malaise/fatigue and shortness of breath. Pertinent negatives include no abdominal pain, no back pain, no diaphoresis, no dizziness, no exertional chest pressure, no fever, no headaches, no leg pain, no nausea, no numbness, no vomiting and no weakness. She has tried nothing for the symptoms.  Risk factors include cardiac disease and hypertension. Her past medical history is significant for HTN.     3/8/2019  Cc: dyspnea  Progressive dyspnea lower ext edema and orthopnea last few days especially. Ventricular response low at primary care today and sent to er. She has afib chronically on bb rate control (unclear if toprol xl 50 daily or met. tartrate 50 bid), and aspirin. No cva prophyl with anticoag. Dure to age fall risk. She has cxr and exam c/w acute chf systolic vs diastolic. No hx of echo. She has k of 3.5 and acute renal insuff creatinine 1.21 baseline <1 but she is 89 y.0. She is to be admitted re: acute new chf, acute renal insuff. Observation for now. Past Medical History/Comorbidities:   Ms. Sharri Kimble  has a past medical history of CAD (coronary artery disease) (1/22/2013), Chronic atrial fibrillation ([facility] (1/22/2013), CRI (chronic renal insufficiency), stage 3 (moderate) ([place] [facility] Gait disturbance (1/22/2013), Mixed hyperlipidemia (1/22/2013), and Unspecified essential hypertension (1/22/2013). Ms. Sharri Kimble  has a past surgical history that includes hx colonoscopy (1996) and hx appendectomy. Social History/Living Environment:   Home Environment: Private residence  # Steps to Enter: 1  One/Two Story Residence: One story  Living Alone: Yes  Support Systems: Family member(s), Child(jose guadalupe)  Patient Expects to be Discharged to[de-identified] Private residence  Current DME Used/Available at Home: Miley Hays, rollator, Raised toilet seat  Prior Level of Function/Work/Activity:   She lives alone in a single story home and typically ambulates modified independently with rollator walker and performs ADLs without assistance. She has a raised toilet seat at home and reports 0 falls in the past 6 months. Family members drive her and manage her medications.      Number of Personal Factors/Comorbidities that affect the Plan of Care: 3+: HIGH COMPLEXITY   EXAMINATION:   Most Recent Physical Functioning:   Gross Assessment:  AROM: Generally decreased, functional  PROM: Within functional limits  Strength: Generally decreased, functional  Coordination: Generally decreased, functional  Tone: Normal  Sensation: Intact               Posture:  Posture (WDL): Exceptions to WDL  Posture Assessment: Forward head, Kyphosis, Rounded shoulders  Balance:  Sitting: Intact  Standing: Impaired  Standing - Static: Good  Standing - Dynamic : Fair(+) Bed Mobility:  Rolling: Stand-by assistance  Supine to Sit: Stand-by assistance  Scooting: Stand-by assistance  Wheelchair Mobility:     Transfers:  Sit to Stand: Minimum assistance  Stand to Sit: Contact guard assistance  Bed to Chair: Contact guard assistance  Gait:     Base of Support: Widened  Speed/Jessica: Slow  Distance (ft): 100 Feet (ft)  Assistive Device: Walker, rolling  Ambulation - Level of Assistance: Stand-by assistance;Contact guard assistance  Interventions: Safety awareness training;Verbal cues      Body Structures Involved:  1. Nerves  2. Heart  3. Lungs  4. Muscles Body Functions Affected:  1. Cardio  2. Respiratory  3. Neuromusculoskeletal  4. Movement Related Activities and Participation Affected:  1. Mobility  2. Self Care  3. Domestic Life  4. Interpersonal Interactions and Relationships  5. Community, Social and [place]   Number of elements that affect the Plan of Care: 4+: HIGH COMPLEXITY   CLINICAL PRESENTATION:   Presentation: Evolving clinical presentation with changing clinical characteristics: MODERATE COMPLEXITY   CLINICAL DECISION MAKIN [facility] Mobility Inpatient Short Form  How much difficulty does the patient currently have. .. Unable A Lot A Little None   1. Turning over in bed (including adjusting bedclothes, sheets and blankets)? [] 1   [] 2   [x] 3   [] 4   2. Sitting down on and standing up from a chair with arms ( e.g., wheelchair, bedside commode, etc.)   [] 1   [] 2   [x] 3   [] 4   3.   Moving from lying on back to sitting on the side of the bed? [] 1   [] 2   [x] 3   [] 4   How much help from another person does the patient currently need. .. Total A Lot A Little None   4. Moving to and from a bed to a chair (including a wheelchair)? [] 1   [] 2   [x] 3   [] 4   5. Need to walk in hospital room? [] 1   [] 2   [x] 3   [] 4   6. Climbing 3-5 steps with a railing? [] 1   [x] 2   [] 3   [] 4   © 2007, Trustees of [address] [place] [address] [address] [address], under license to [employer]. All rights reserved      Score:  Initial: 17 Most Recent: X (Date: -- )    Interpretation of Tool:  Represents activities that are increasingly more difficult (i.e. Bed mobility, Transfers, Gait). Medical Necessity:     · Patient demonstrates excellent rehab potential due to higher previous functional level. Reason for Services/Other Comments:  · Patient continues to require modification of therapeutic interventions to increase complexity of exercises. Use of outcome tool(s) and clinical judgement create a POC that gives a: Questionable prediction of patient's progress: MODERATE COMPLEXITY            TREATMENT:   (In addition to Assessment/Re-Assessment sessions the following treatments were rendered)   Pre-treatment Symptoms/Complaints:  none  Pain: Initial:   Pain Intensity 1: 0  Post Session:  0/10     Therapeutic Activity: (    23 minutes): Therapeutic activities including Chair transfers, Ambulation on level ground and standing balance activities and sit<>stand transfers to improve mobility, strength, balance and activity tolerance. Required minimal Safety awareness training;Verbal cues to promote static and dynamic balance in standing.      Braces/Orthotics/Lines/Etc:   · O2 Device: Room air  Treatment/Session Assessment:    · Response to Treatment:  Patient reported more confidence in her mobility, ambulated with CGA/' with RW.  · Interdisciplinary Collaboration:   o Physical Therapist  o Registered Nurse  o   · After treatment position/precautions: o Up in chair  o Bed alarm/tab alert on  o Bed/Chair-wheels locked  o Bed in low position  o Call light within reach  o RN notified  o Family at bedside   · Compliance with Program/Exercises: Will assess as treatment progresses  · Recommendations/Intent for next treatment session: \"Next visit will focus on advancements to more challenging activities and reduction in assistance provided\".   Total Treatment Duration:  PT Patient Time In/Time Out  Time In: 0853  Time Out: Jayda Miranda, DPT

## 2019-03-11 NOTE — PHYSICIAN ADVISORY
Letter of Determination: Upgrade from Observation to Inpatient Status    This patient was originally hospitalized as Outpatient Status with Observation Services on 3/8/2019 for acute on chronic systolic congestive heart failure. This patient now meets for Inpatient Admission in accordance with CMS regulation Section 43 .3. Specifically, patient's stay is now over Two Midnights and was medically necessary. The patient's stay was medically necessary based on extreme advanced age, blood pressure to 208/91 mmHg, and pulse to 40 beats per minute. Consistent with CMS guidelines, patient meets for inpatient status. It is our recommendation that this patient's hospitalization status should be upgraded from OBSERVATION to INPATIENT status.      The final decision regarding the patient's hospitalization status depends on the attending physician's judgement.     Ginny Doan MD, JOELLEN,   Physician Johnie Gaona.

## 2019-03-11 NOTE — PROGRESS NOTES
Problem: Falls - Risk of  Goal: *Absence of Falls  Document Chuy Fall Risk and appropriate interventions in the flowsheet.   Outcome: Progressing Towards Goal  Fall Risk Interventions:  Mobility Interventions: Patient to call before getting OOB, Bed/chair exit alarm, PT Consult for mobility concerns         Medication Interventions: Teach patient to arise slowly, Patient to call before getting OOB, Assess postural VS orthostatic hypotension    Elimination Interventions: Call light in reach, Toileting schedule/hourly rounds, Toilet paper/wipes in reach, Patient to call for help with toileting needs

## 2019-03-11 NOTE — PROGRESS NOTES
Following for potential CHF Bundle Payment. Patient is now inpatient. RRAT- 21- would benefit from a Palliative Care consult. Cardiology following and seen by CHF Navigator. Eligible for Health  to follow for 90 days at no cost.   Patient now needs rehab. [address] vs [address]. Our team does weekly rounding at [facility], so would be a benefit to choose our preferred facility. Will need TC-7 follow up with Card.      Alina Rojas RN- [facility] [place], BSN  Care Transition/ Bundled Payment Navigator  [phone]

## 2019-03-11 NOTE — PROGRESS NOTES
Problem: Falls - Risk of  Goal: *Absence of Falls  Document Chuy Fall Risk and appropriate interventions in the flowsheet.   Outcome: Progressing Towards Goal  Fall Risk Interventions:  Mobility Interventions: Bed/chair exit alarm, Communicate number of staff needed for ambulation/transfer, Patient to call before getting OOB         Medication Interventions: Assess postural VS orthostatic hypotension, Bed/chair exit alarm, Patient to call before getting OOB, Teach patient to arise slowly    Elimination Interventions: Patient to call for help with toileting needs, Call light in reach, Toileting schedule/hourly rounds

## 2019-03-11 NOTE — PROGRESS NOTES
Family has decided on rehab for d/c. They request referrals to [address] [place] [address] in that order. Will need PPD and informed nursing. CM following. [facility] has offered a bed for patient and will have available Thursday. CM following.

## 2019-03-11 NOTE — PROGRESS NOTES
Care Management Interventions  PCP Verified by CM: Yes  Palliative Care Criteria Met (RRAT>21 & CHF Dx)?: Yes(RRAT 21 Dx CHF)  Transition of Care Consult (CM Consult): Discharge Planning  Discharge Durable Medical Equipment: No(RW with seat and elevated toilet seat)  Physical Therapy Consult: Yes  Occupational Therapy Consult: No  Speech Therapy Consult: No  Current Support Network: Lives Alone  Confirm Follow Up Transport: Family  Plan discussed with Pt/Family/Caregiver: Yes  Freedom of Choice Offered: Yes  Discharge Location  Discharge Placement: Unable to determine at this time  Met with patient and daughter in law for d/c planning. Requesting rehab explained observation to patient and Daughter in law and that Medicare would not cover rehab under observation for SNF. Patient alert and oriented x 3, independent ADL's and lives alone in one level home. Her son and daughter in law live one mile from patient. DME includes RW with seat and elevated toilet seat. She does not drive and her son and daughter in law provide transportation. She lives in [place]. She has Medicare and [employer] Supplement secondary. She is able to obtain her medications at [facility] in [facility]. PT seeing patient and will assess needs. Discussed home health RN/PT/OT/aide that can be ordered, family staying or hiring sitters and  possibly medically ready for d/c today. Patient waiting for son to come and discuss d/c options with him. Patient and daughter in law voice understanding. CM following.

## 2019-03-12 LAB
MM INDURATION POC: 0 MM (ref 0–5)
PPD POC: NEGATIVE NEGATIVE

## 2019-03-12 PROCEDURE — 74011250637 HC RX REV CODE- 250/637: Performed by: NURSE PRACTITIONER

## 2019-03-12 PROCEDURE — 74011250637 HC RX REV CODE- 250/637: Performed by: INTERNAL MEDICINE

## 2019-03-12 PROCEDURE — 74011250636 HC RX REV CODE- 250/636: Performed by: INTERNAL MEDICINE

## 2019-03-12 PROCEDURE — 65660000000 HC RM CCU STEPDOWN

## 2019-03-12 PROCEDURE — 97165 OT EVAL LOW COMPLEX 30 MIN: CPT

## 2019-03-12 PROCEDURE — 74011250637 HC RX REV CODE- 250/637: Performed by: FAMILY MEDICINE

## 2019-03-12 PROCEDURE — 97530 THERAPEUTIC ACTIVITIES: CPT

## 2019-03-12 RX ADMIN — ENOXAPARIN SODIUM 30 MG: 30 INJECTION, SOLUTION INTRAVENOUS; SUBCUTANEOUS at 13:19

## 2019-03-12 RX ADMIN — FUROSEMIDE 40 MG: 40 TABLET ORAL at 08:38

## 2019-03-12 RX ADMIN — Medication 10 ML: at 17:40

## 2019-03-12 RX ADMIN — SPIRONOLACTONE 25 MG: 25 TABLET ORAL at 08:38

## 2019-03-12 RX ADMIN — AMLODIPINE BESYLATE 5 MG: 5 TABLET ORAL at 08:38

## 2019-03-12 RX ADMIN — LISINOPRIL 20 MG: 20 TABLET ORAL at 17:40

## 2019-03-12 RX ADMIN — Medication 5 ML: at 06:11

## 2019-03-12 RX ADMIN — ASPIRIN 81 MG: 81 TABLET, COATED ORAL at 08:38

## 2019-03-12 RX ADMIN — BENZONATATE 100 MG: 100 CAPSULE ORAL at 21:36

## 2019-03-12 RX ADMIN — ATORVASTATIN CALCIUM 40 MG: 40 TABLET, FILM COATED ORAL at 08:38

## 2019-03-12 RX ADMIN — ACETAMINOPHEN 650 MG: 325 TABLET, FILM COATED ORAL at 21:29

## 2019-03-12 RX ADMIN — LISINOPRIL 20 MG: 20 TABLET ORAL at 08:38

## 2019-03-12 RX ADMIN — Medication 5 ML: at 21:29

## 2019-03-12 RX ADMIN — BISACODYL 5 MG: 5 TABLET, COATED ORAL at 13:19

## 2019-03-12 NOTE — PROGRESS NOTES
Problem: Falls - Risk of  Goal: *Absence of Falls  Document Chuy Fall Risk and appropriate interventions in the flowsheet.   Outcome: Progressing Towards Goal  Fall Risk Interventions:  Mobility Interventions: Bed/chair exit alarm, Communicate number of staff needed for ambulation/transfer, Patient to call before getting OOB    Mentation Interventions: Bed/chair exit alarm, Adequate sleep, hydration, pain control, Door open when patient unattended, Family/sitter at bedside, Increase mobility, Update white board    Medication Interventions: Assess postural VS orthostatic hypotension, Bed/chair exit alarm, Patient to call before getting OOB, Teach patient to arise slowly    Elimination Interventions: Call light in reach, Toileting schedule/hourly rounds, Patient to call for help with toileting needs

## 2019-03-12 NOTE — PROGRESS NOTES
Bedside and Verbal shift change report given to self (oncoming nurse) by Doris Terry RN (offgoing nurse). Report included the following information SBAR, Kardex, ED Summary, Procedure Summary, Intake/Output, MAR, Recent Results and Cardiac Rhythm A-fib.

## 2019-03-12 NOTE — PROGRESS NOTES
1. Patient will complete lower body bathing and dressing with min A and adaptive equipment as needed. 2. Patient will complete toileting with supervision. 3. Patient will tolerate 23 minutes of OT treatment with 2-3 rest breaks to increase activity tolerance for ADLs. 4. Patient will complete functional transfers with MOD I and adaptive equipment as needed. 5. Patient will complete functional mobility for household distances with SBA and adaptive equipment as needed. 6. Patient will tolerate 10 minutes BUE strengthening to increase activity tolerance for ADLs    Timeframe: 7 visits       OCCUPATIONAL THERAPY: Initial Assessment, Daily Note and AM 3/12/2019  INPATIENT: OT Visit Days: 1  [place] / [place] / Product Type: Medicare /      NAME/AGE/GENDER: Anisa Mejía is a 80 y.o. female   PRIMARY DIAGNOSIS:  CHF (congestive heart failure) ([facility]) [I50.9]  CHF (congestive heart failure) ([facility]) [I50.9] CHF (congestive heart failure) ([facility]) [facility]congest[facility]) ([facility]       ICD-10: Treatment Diagnosis:    · Generalized Muscle Weakness (M62.81)   Precautions/Allergies:    fall precautions Patient has no known allergies. ASSESSMENT:     Ms. [place] presents for CHF. Upon arrival, pt sitting upright in bed with son and RN at bedside. Pt alert, oriented x 4, and agreeable to OT evaluation. Pt reports living alone in a 1-story home with 0 steps to enter; son lives nearby for support. At baseline, pt was independent with ADLs; pt uses Meals on Wheels and family provides transportation. Pt is MOD I for functional mobility with use of rollator. Today, pt completed supine to sit transfer to edge of bed with SBA. Upon sitting upright, pt's static and dynamic sitting balance is intact. Pt's BUE AROM is WFL; BUE strength reduced to 3/5 throughout. Pt stood and ambulated ~100 ft with CGA x RW and additional time provided.  Pt returned to room and supine in bed with needs met and call light within reach. At this time, pt is functioning below baseline for ADLs and functional mobility. Pt would benefit from skilled OT services to address OT goals and plan of care. This section established at most recent assessment   PROBLEM LIST (Impairments causing functional limitations):  1. Decreased Strength  2. Decreased ADL/Functional Activities  3. Decreased Transfer Abilities  4. Decreased Ambulation Ability/Technique  5. Decreased Balance  6. Decreased Activity Tolerance  7. Increased Fatigue  8. Decreased Noble with Home Exercise Program   INTERVENTIONS PLANNED: (Benefits and precautions of occupational therapy have been discussed with the patient.)  1. Activities of daily living training  2. Adaptive equipment training  3. Balance training  4. Clothing management  5. Community reintergration  6. Donning&doffing training  7. Neuromuscular re-eduation  8. Re-evaluation  9. Therapeutic activity  10. Therapeutic exercise     TREATMENT PLAN: Frequency/Duration: Follow patient 3x/week to address above goals. Rehabilitation Potential For Stated Goals: Good     RECOMMENDED REHABILITATION/EQUIPMENT: (at time of discharge pending progress): Due to the probability of continued deficits (see above) this patient will likely need continued skilled occupational therapy after discharge. Equipment:    TBD              OCCUPATIONAL PROFILE AND HISTORY:   History of Present Injury/Illness (Reason for Referral):  See H&P  Past Medical History/Comorbidities:   Ms. Anders Sanon  has a past medical history of CAD (coronary artery disease) (1/22/2013), Chronic atrial fibrillation ([address] (1/22/2013), CRI (chronic renal insufficiency), stage 3 (moderate) ([address] Gait disturbance (1/22/2013), Mixed hyperlipidemia (1/22/2013), and Unspecified essential hypertension (1/22/2013). Ms. Anders Sanon  has a past surgical history that includes hx colonoscopy (1996) and hx appendectomy.   Social History/Living Environment: Home Environment: Private residence  # Steps to Enter: 0  One/Two Story Residence: One story  Living Alone: Yes  Support Systems: Family member(s)  Patient Expects to be Discharged to[de-identified] Private residence  Current DME Used/Available at Home: Raised toilet seat, Walker, rolling  Prior Level of Function/Work/Activity:  Independent with ADLs; had Meals on Wheels; family provides transport; uses rollator for fx mobility. Dominant Side:         RIGHT  Personal Factors:          Sex:  female        Age:  80 y.o. Other factors that influence how disability is experienced by the patient:  Multiple co-morbidities    Number of Personal Factors/Comorbidities that affect the Plan of Care: Brief history (0):  LOW COMPLEXITY   ASSESSMENT OF OCCUPATIONAL PERFORMANCE[de-identified]   Activities of Daily Living:   Basic ADLs (From Assessment) Complex ADLs (From Assessment)   Feeding: Supervision, Setup  Oral Facial Hygiene/Grooming: Supervision, Setup  Bathing: Moderate assistance  Upper Body Dressing: Minimum assistance  Lower Body Dressing: Moderate assistance  Toileting: Minimum assistance Instrumental ADL  Meal Preparation: Maximum assistance  Homemaking: Maximum assistance   Grooming/Bathing/Dressing Activities of Daily Living     Cognitive Retraining  Safety/Judgement: Awareness of environment; Fall prevention                       Bed/Mat Mobility  Rolling: Stand-by assistance  Supine to Sit: Stand-by assistance  Sit to Supine: Minimum assistance  Sit to Stand: Minimum assistance  Scooting: Stand-by assistance       Most Recent Physical Functioning:   Gross Assessment:  AROM: Generally decreased, functional  PROM: Generally decreased, functional  Strength: Generally decreased, functional  Coordination: Generally decreased, functional  Tone: Normal  Sensation: Intact               Posture:  Posture (WDL): Exceptions to WDL  Posture Assessment:  Forward head, Kyphosis, Rounded shoulders  Balance:  Sitting: Intact  Standing: Impaired  Standing - Static: Good  Standing - Dynamic : Fair Bed Mobility:  Rolling: Stand-by assistance  Supine to Sit: Stand-by assistance  Sit to Supine: Minimum assistance  Scooting: Stand-by assistance  Wheelchair Mobility:     Transfers:  Sit to Stand: Minimum assistance  Stand to Sit: Contact guard assistance            Patient Vitals for the past 6 hrs:   BP BP Patient Position SpO2 Pulse   19 0436 126/54 At rest 96 % (!) 56   19 0836 158/54  98 % 72   19 0939 137/60  99 % 71       Mental Status  Neurologic State: Alert  Orientation Level: Oriented X4  Cognition: Follows commands  Perception: Appears intact  Perseveration: No perseveration noted  Safety/Judgement: Awareness of environment, Fall prevention                          Physical Skills Involved:  1. Balance  2. Strength  3. Activity Tolerance Cognitive Skills Affected (resulting in the inability to perform in a timely and safe manner): 1. none Psychosocial Skills Affected:  1. Habits/Routines  2. Environmental Adaptation   Number of elements that affect the Plan of Care: 3-5:  MODERATE COMPLEXITY   CLINICAL DECISION MAKIN[address] [place], [place] [id number] AM-PAC 6 Clicks   Daily Activity Inpatient Short Form  How much help from another person does the patient currently need. .. Total A Lot A Little None   1. Putting on and taking off regular lower body clothing? [] 1   [x] 2   [] 3   [] 4   2. Bathing (including washing, rinsing, drying)? [] 1   [x] 2   [] 3   [] 4   3. Toileting, which includes using toilet, bedpan or urinal?   [] 1   [] 2   [x] 3   [] 4   4. Putting on and taking off regular upper body clothing? [] 1   [] 2   [x] 3   [] 4   5. Taking care of personal grooming such as brushing teeth? [] 1   [] 2   [x] 3   [] 4   6. Eating meals? [] 1   [] 2   [x] 3   [] 4   © , Trustees of [address] [place] [id number], under license to [employer].  All rights reserved      Score:  Initial: 16 Most Recent: X (Date: -- ) Interpretation of Tool:  Represents activities that are increasingly more difficult (i.e. Bed mobility, Transfers, Gait). Medical Necessity:     · Patient is expected to demonstrate progress in strength, balance, coordination and functional technique to decrease assistance required with ADLs and functional mobility. .  Reason for Services/Other Comments:  · Patient continues to require skilled intervention due to medical complications and patient unable to attend/participate in therapy as expected. Use of outcome tool(s) and clinical judgement create a POC that gives a: LOW COMPLEXITY         TREATMENT:   (In addition to Assessment/Re-Assessment sessions the following treatments were rendered)     Pre-treatment Symptoms/Complaints:  \"I am feeling better today but I am just anxious about going home. \"  Pain: Initial:   Pain Intensity 1: 0 /10 Post Session:  same     Therapeutic Activity: (    10 minutes): Therapeutic activities including Bed transfers and Ambulation on level ground to improve mobility, strength, balance and coordination. Required minimal   to promote static and dynamic balance in standing. Pt completed functional transfers with SBA. Pt completed functional mobility for ~100 ft with CGA x RW and additional time provided. Braces/Orthotics/Lines/Etc:   · O2 Device: Room air  Treatment/Session Assessment:    · Response to Treatment:  Tolerated well with no issues noted. · Interdisciplinary Collaboration:   o Occupational Therapist  o Registered Nurse  · After treatment position/precautions:   o Supine in bed  o Bed alarm/tab alert on  o Bed/Chair-wheels locked  o Bed in low position  o Call light within reach   · Compliance with Program/Exercises: Will assess as treatment progresses. · Recommendations/Intent for next treatment session: \"Next visit will focus on advancements to more challenging activities and reduction in assistance provided\".   Total Treatment Duration:  OT Patient Time In/Time Out  Time In: 0840  Time Out: 500 Mai Ferrell, OT

## 2019-03-12 NOTE — PROGRESS NOTES
Bedside and Verbal shift change report given to Joe Crouch RN (oncoming nurse) by self Viv banda). Report included the following information SBAR, Kardex and MAR.

## 2019-03-12 NOTE — PROGRESS NOTES
Bedside and Verbal shift change report given to Josep Dean RN (oncoming nurse) by self [facility] nurse). Report included the following information SBAR, Kardex and MAR.

## 2019-03-12 NOTE — ROUTINE PROCESS
Had lengthy discussion with patient in regards to hf and POC. We discussed medications and rationale for each. Patient was experiencing coughing, shortness of breath, abdominal fullness, paul;y satiety, and BLE edema prior to admit with 10lb weight gain and was instructed to weigh daily to identify symptoms before they progress to that point. Encouraged notifying cardiology of s/s as listed below. Left contact information for patient and family if they have any questions/concerns. CHF teaching completed, verbalize emphasis on monitoring self and report to MD:   If you gain 2 lbs in one day or 5 lbs in a week, and short of breath.  If you can not lay flat without developing short of breath or rapid breathing at night; or if it wakes you up. Develop a cough or wheezing.  If you notice swollen hands/feet/ankles or stomach with a bloated/ full feeling.  If you are  more confused or mentally fuzzy or dizzy.  If you notice a rapid or change in your heart rate.  If you become more exhausted all the time and unable to do the same level of activity without stopping to catch your breath. Drink no more than 8 cups a day in 8 oz. cups. Limit Cola Drinks. Your Heart can not handle any more. Stay away from salt (limit anything with salt or sodium in it). Limit to 250mg per serving. Exercise needs to be started with your Doctors approval.  Reduce stress; Call myself or Provider if assistance is needed. Pass post test via teach back, will make self available post DC ,if an questions arise. Diabetic teaching completed.  Planner/scale @ BS:  60 mins total

## 2019-03-12 NOTE — PROGRESS NOTES
Bedside and Verbal shift change report given to self (oncoming nurse) by Maricruz Mcclendon RN (offgoing nurse). Report included the following information SBAR, Kardex, ED Summary, Procedure Summary, Intake/Output, MAR, Recent Results and Cardiac Rhythm A-fib with PVCs 40s-50s.

## 2019-03-12 NOTE — PROGRESS NOTES
Problem: Falls - Risk of  Goal: *Absence of Falls  Document Chuy Fall Risk and appropriate interventions in the flowsheet. Outcome: Progressing Towards Goal  Fall Risk Interventions:  Mobility Interventions: Bed/chair exit alarm, Communicate number of staff needed for ambulation/transfer, Patient to call before getting OOB    Mentation Interventions: Bed/chair exit alarm, Adequate sleep, hydration, pain control, Door open when patient unattended, Family/sitter at bedside, Increase mobility, Update white board    Medication Interventions: Assess postural VS orthostatic hypotension, Bed/chair exit alarm, Patient to call before getting OOB, Teach patient to arise slowly    Elimination Interventions: Call light in reach, Toileting schedule/hourly rounds, Patient to call for help with toileting needs         Patient progressing towards goal with no falls on current admission. Patient without confusion, agitation, or sensory perception deficits. Patient has extremely weak and unsteady gait on ambulation. Personal belongings are within reach. Bed is in the low and locked position with side rails up x3 and bed alarm active. Yellow gripper socks to bilateral feet. Call light within reach and patient verbalizes understanding of use.

## 2019-03-12 NOTE — PROGRESS NOTES
Bedside and Verbal shift change report given to self (oncoming nurse) by Chiki Nolen RN (offgoing nurse). Report included the following information SBAR, Kardex and MAR.

## 2019-03-12 NOTE — PROGRESS NOTES
Bedside and Verbal shift change report given to Jayesh Mckeon RN (oncoming nurse) by self Marilynt Cane nurse). Report included the following information SBAR, Kardex, ED Summary, Procedure Summary, Intake/Output, MAR, Recent Results and Cardiac Rhythm a-fib 40s-60s.

## 2019-03-12 NOTE — PROGRESS NOTES
Progress Note    Patient: Allan Gordon MRN: [id number]  SSN: [id number]    YOB: 1926  Age: 80 y.o. Sex: female      Admit Date: 3/8/2019    LOS: 1 day     Subjective:     CC: SOB    79 YO female patient admitted with CHF exacerbation. Awaiting rehab placement. Objective:     Vitals:    03/12/19 0939 03/12/19 1431 03/12/19 1736 03/12/19 1845   BP: 137/60 147/59 131/59 120/63   Pulse: 71 64 61 66   Resp: 20 18 16 20   Temp: 97.4 °F (36.3 °C) 97.9 °F (36.6 °C) 98.1 °F (36.7 °C) 97.8 °F (36.6 °C)   SpO2: 99% 97% 99% 93%   Weight:       Height:            Intake and Output:  Current Shift: No intake/output data recorded. Last three shifts: 03/11 0701 - 03/12 1900  In: 1560 [P.O.:1560]  Out: 4325 [Urine:4325]    Physical Exam:     General:                    The patient is a pleasant elderly female in no acute distress. Head:                                   Normocephalic/atraumatic. Eyes:                                   No palpebral pallor or scleral icterus. ENT:                                    External auricular and nasal exam within normal limits. Mucous membranes are moist.  Neck:                                   Supple, non-tender, no JVD. Lungs:                       Clear to auscultation bilaterally without wheezes or crackles. Decreased breath sounds at bases. No respiratory distress or accessory muscle use. Heart:                                  irregular rate and rhythm, without murmurs, rubs, or gallops. Abdomen:                  Soft, non-tender, mildly distended with normoactive bowel sounds. Genitourinary:           No tenderness over the bladder or bilateral CVAs. Extremities:               Without clubbing, cyanosis, mild edema. Skin:                                    Normal color, texture, and turgor. No rashes, lesions, or jaundice.   Pulses: Radial and dorsalis pedis pulses present 2+ bilaterally. Capillary refill <2s. Neurologic:                CN II-XII grossly intact and symmetrical.                                               Moving all four extremities well with no focal deficits. Psychiatric:                Pleasant demeanor, appropriate affect. Alert and oriented x 3      Lab/Data Review:    XR chest   3-8-2019  IMPRESSION: Cardiomegaly and pulmonary edema. CHF exacerbation with small  pleural effusions. Echo   3-8-2019  SUMMARY:    -  Left ventricle: Systolic function was hyperdynamic. Ejection fraction was  estimated in the range of 65 % to 70 %. This study was inadequate for the  evaluation of regional wall motion. -  Left atrium: The atrium was markedly dilated. -  Right atrium: The atrium was markedly dilated. -  Inferior vena cava, hepatic veins: The inferior vena cava was mildly  dilated. The respirophasic change in diameter was less than 50%. -  Mitral valve: There was mild annular calcification. There was moderate  thickening of the anterior leaflet. There was moderate regurgitation. -  Tricuspid valve: There was moderate regurgitation. I have reviewed chest x-ray and ECG myself.       Current Facility-Administered Medications:     bisacodyl (DULCOLAX) tablet 5 mg, 5 mg, Oral, DAILY PRN, Linus Cruz MD, 5 mg at 03/12/19 1319    furosemide (LASIX) tablet 40 mg, 40 mg, Oral, DAILY, Dania Felipe MD, 40 mg at 03/12/19 5054    spironolactone (ALDACTONE) tablet 25 mg, 25 mg, Oral, DAILY, Dania Felipe MD, 25 mg at 03/12/19 1626    sodium chloride (NS) flush 5-40 mL, 5-40 mL, IntraVENous, Q8H, Flex Rojo, DO, 10 mL at 03/12/19 1740    sodium chloride (NS) flush 5-40 mL, 5-40 mL, IntraVENous, PRN, Flex Rojo, DO    acetaminophen (TYLENOL) tablet 650 mg, 650 mg, Oral, Q4H PRN, Rebekah Boss DO, 650 mg at 03/11/19 2221   HYDROcodone-acetaminophen (NORCO) 5-325 mg per tablet 1 Tab, 1 Tab, Oral, Q4H PRN, Yusuf Rojo DO    naloxone [facility]) injection 0.4 mg, 0.4 mg, IntraVENous, PRN, Yusuf Rojo, DO    enoxaparin (LOVENOX) injection 30 mg, 30 mg, SubCUTAneous, Q24H, Yusuf Rojo, , 30 mg at 03/12/19 1319    lisinopril (PRINIVIL, ZESTRIL) tablet 20 mg, 20 mg, Oral, BID, Yusuf Rojo, , 20 mg at 03/12/19 1740    atorvastatin (LIPITOR) tablet 40 mg, 40 mg, Oral, DAILY, Yusuf Rojo DO, 40 mg at 03/12/19 8885    aspirin delayed-release tablet 81 mg, 81 mg, Oral, DAILY, Yusuf Rojo DO, 81 mg at 03/12/19 4205    hydrALAZINE (APRESOLINE) 20 mg/mL injection 10 mg, 10 mg, IntraVENous, Q8H PRN, Yusuf Rojo DO, 10 mg at 03/08/19 1419    amLODIPine (NORVASC) tablet 5 mg, 5 mg, Oral, DAILY, Evie Mcgovern D, NP, 5 mg at 03/12/19 2100    lip protectant (BLISTEX) ointment, , Topical, PRN, Vibha Sorto MD    benzonatate (TESSALON) capsule 100 mg, 100 mg, Oral, TID PRN, Otto Dash MD, 100 mg at 03/11/19 2233        Assessment:     Principal Problem:    CHF (congestive heart failure) ([facility] (3/8/2019)    Active Problems:    Mixed hyperlipidemia (1/22/2013)      Hypertension (1/22/2013)      Chronic atrial fibrillation (HCC) (1/22/2013)      Overview: Refuses intervention, anticoagulation etc        Plan:     Acute diastolic CHF with pulmonary edema. Improved with diuretic. Continue PO Lasix. Bradycardia is resolved after discontinuation of beta blocker. ECHO showed EF is preserved. Hypertension  Monitor blood pressure and manage accordingly. Continue home medications. Hyperlipidemia  Continue home medications. I have discussed the plan of care with patient and family member at bedside. DVT prophylaxis : Lovenox [place]         Physical therapy for strength and ambulation. Family requested  placement to short-term rehabilitation facility.      Signed By: Amy Mejia Jose Alvarenga MD     March 12, 2019

## 2019-03-13 LAB
ANION GAP SERPL CALC-SCNC: 8 MMOL/L (ref 7–16)
BUN SERPL-MCNC: 21 MG/DL (ref 8–23)
CALCIUM SERPL-MCNC: 8.6 MG/DL (ref 8.3–10.4)
CHLORIDE SERPL-SCNC: 99 MMOL/L (ref 98–107)
CO2 SERPL-SCNC: 29 MMOL/L (ref 21–32)
CREAT SERPL-MCNC: 1.02 MG/DL (ref 0.6–1)
ERYTHROCYTE [DISTWIDTH] IN BLOOD BY AUTOMATED COUNT: 18 % (ref 11.9–14.6)
GLUCOSE SERPL-MCNC: 98 MG/DL (ref 65–100)
HCT VFR BLD AUTO: 33.3 % (ref 35.8–46.3)
HGB BLD-MCNC: 10.3 G/DL (ref 11.7–15.4)
MCH RBC QN AUTO: 25.9 PG (ref 26.1–32.9)
MCHC RBC AUTO-ENTMCNC: 30.9 G/DL (ref 31.4–35)
MCV RBC AUTO: 83.9 FL (ref 79.6–97.8)
MM INDURATION POC: 0 MM (ref 0–5)
NRBC # BLD: 0 K/UL (ref 0–0.2)
PLATELET # BLD AUTO: 224 K/UL (ref 150–450)
PMV BLD AUTO: 10.3 FL (ref 9.4–12.3)
POTASSIUM SERPL-SCNC: 4.6 MMOL/L (ref 3.5–5.1)
PPD POC: NEGATIVE NEGATIVE
RBC # BLD AUTO: 3.97 M/UL (ref 4.05–5.2)
SODIUM SERPL-SCNC: 136 MMOL/L (ref 136–145)
WBC # BLD AUTO: 5.1 K/UL (ref 4.3–11.1)

## 2019-03-13 PROCEDURE — 77030019605

## 2019-03-13 PROCEDURE — 65660000000 HC RM CCU STEPDOWN

## 2019-03-13 PROCEDURE — 97530 THERAPEUTIC ACTIVITIES: CPT

## 2019-03-13 PROCEDURE — 97110 THERAPEUTIC EXERCISES: CPT

## 2019-03-13 PROCEDURE — 74011250637 HC RX REV CODE- 250/637: Performed by: INTERNAL MEDICINE

## 2019-03-13 PROCEDURE — 80048 BASIC METABOLIC PNL TOTAL CA: CPT

## 2019-03-13 PROCEDURE — 36415 COLL VENOUS BLD VENIPUNCTURE: CPT

## 2019-03-13 PROCEDURE — 85027 COMPLETE CBC AUTOMATED: CPT

## 2019-03-13 PROCEDURE — 74011250637 HC RX REV CODE- 250/637: Performed by: NURSE PRACTITIONER

## 2019-03-13 RX ADMIN — Medication 5 ML: at 22:34

## 2019-03-13 RX ADMIN — FUROSEMIDE 40 MG: 40 TABLET ORAL at 09:24

## 2019-03-13 RX ADMIN — LISINOPRIL 20 MG: 20 TABLET ORAL at 09:27

## 2019-03-13 RX ADMIN — ASPIRIN 81 MG: 81 TABLET, COATED ORAL at 09:26

## 2019-03-13 RX ADMIN — AMLODIPINE BESYLATE 5 MG: 5 TABLET ORAL at 09:27

## 2019-03-13 RX ADMIN — ACETAMINOPHEN 650 MG: 325 TABLET, FILM COATED ORAL at 22:33

## 2019-03-13 RX ADMIN — SPIRONOLACTONE 25 MG: 25 TABLET ORAL at 09:26

## 2019-03-13 RX ADMIN — Medication 5 ML: at 19:17

## 2019-03-13 RX ADMIN — LISINOPRIL 20 MG: 20 TABLET ORAL at 19:16

## 2019-03-13 RX ADMIN — Medication 5 ML: at 05:46

## 2019-03-13 RX ADMIN — ATORVASTATIN CALCIUM 40 MG: 40 TABLET, FILM COATED ORAL at 09:24

## 2019-03-13 NOTE — PROGRESS NOTES
Bedside and Verbal shift change report given to 0 [facility] (oncoming nurse) by self Dann banda). Report included the following information SBAR, Kardex and MAR.

## 2019-03-13 NOTE — PROGRESS NOTES
Problem: Mobility Impaired (Adult and Pediatric)  Goal: *Acute Goals and Plan of Care (Insert Text)  STG:  (1.)Ms. House will move from supine to sit and sit to supine , scoot up and down and roll side to side with SUPERVISION within 3 treatment day(s). (2.)Ms. House will transfer from bed to chair and chair to bed with STAND BY ASSIST using the least restrictive device within 3 treatment day(s). (3.)Ms. House will ambulate with STAND BY ASSIST for 150 feet with the least restrictive device within  treatment day(s). (4.)Ms. House will perform standing static and dynamic balance activities x 15 minutes with STAND BY ASSIST to improve safety within 3 day(s). (5.)Ms. House will perform LE exercises with 1 to 2 cues for form within 3 days to improve strength for functional transfers and ambulation. LTG:  (1.)Ms. House will move from supine to sit and sit to supine , scoot up and down and roll side to side in bed with MODIFIED INDEPENDENCE within 7 treatment day(s). (2.)Ms. House will transfer from bed to chair and chair to bed with MODIFIED INDEPENDENCE using the least restrictive device within 7 treatment day(s). (3.)Ms. House will ambulate with MODIFIED INDEPENDENCE for 250+ feet with the least restrictive device within 7 treatment day(s). (4.)Ms. House will perform standing static and dynamic balance activities x 15 minutes with MODIFIED INDEPENDENCE to improve safety within 7 day(s).   ________________________________________________________________________________________________      PHYSICAL THERAPY: Daily Note and PM 3/13/2019  INPATIENT: PT Visit Days : 2  [place] / Plan[place] B / Product Type: Medicare /       NAME/AGE/GENDER: Satish Madera is a 80 y.o. female   PRIMARY DIAGNOSIS: CHF (congestive heart failure) (HCC) [I50.9]  CHF (congestive heart failure) (HCC) [I50.9] CHF (congestive heart failure) (HCC) CHF (congestive heart failure) ([facility] [address]       ICD-10: Treatment Diagnosis:    · Difficulty in walking, Not elsewhere classified (R26.2)   Precaution/Allergies:  Patient has no known allergies. ASSESSMENT:     Ms. Rosie Kussmaul is a 80 y.o. female admitted for CHF. She lives alone in a single story home and typically ambulates modified independently with rollator walker and performs ADLs without assistance. She has a raised toilet seat at home and reports 0 falls in the past 6 months. Family members drive her and manage her medications. She is sitting in recliner and agreeable to physical therapy treatment. She performed sit to stand transfers this date with stand by assistance, ambulated 100' with very slow shane and stand by assist. Multiple sit<>stand transfers and standing balance activities completed upon return to room, requiring CGA for stand>sit transfer with verbal cues for hand placement. She completed BLE exercises requiring additional time to complete and verbal cues for form. Continued therapeutic activity to include sit to stand transfer, ambulation to bedside commode, standing balance while managing lower body clothing sharla performing perineal care. She progressed to requiring less assistance for transfer this session. SpO2 98% on room air post ambulation. Marii Conde is currently functioning below her baseline and would benefit from skilled PT during acute care stay to maximize safety and independence with functional mobility. This section established at most recent assessment   PROBLEM LIST (Impairments causing functional limitations):  1. Decreased Strength  2. Decreased ADL/Functional Activities  3. Decreased Transfer Abilities  4. Decreased Ambulation Ability/Technique  5. Decreased Balance  6. Decreased Activity Tolerance  7. Decreased Pacing Skills  8. Decreased Knowledge of Precautions  9.  Decreased S[place] with Home Exercise Program   INTERVENTIONS PLANNED: (Benefits and precautions of physical therapy have been discussed with the patient.)  1. Balance Exercise  2. Bed Mobility  3. Family Education  4. Gait Training  5. Group Therapy  6. Home Exercise Program (HEP)  7. Therapeutic Activites  8. Therapeutic Exercise/Strengthening  9. Transfer Training     TREATMENT PLAN: Frequency/Duration: 3 times a week for duration of hospital stay  Rehabilitation Potential For Stated Goals: Good     RECOMMENDED REHABILITATION/EQUIPMENT: (at time of discharge pending progress): Due to the probability of continued deficits (see above) this patient will likely need continued skilled physical therapy after discharge. Equipment:    Bedside Commode              HISTORY:   History of Present Injury/Illness (Reason for Referral):  Patient with atrial fibrillation and high blood pressure. On amlodipine, Lotensin and metoprolol 50 mg. Went to primary care doctor's office with worsening shortness of breath and bilateral lower extremity edema along with a 10 pound weight gain.  Has had a cough for the past week.  On his evaluation noted, heart rate 34.  On EKG.  Sent here for further evaluation.        The history is provided by the patient and medical records. No  was used.   Slow Heart Rate    This is a new problem. The current episode started less than 1 hour ago. The problem has not changed since onset. The problem occurs constantly. The pain is associated with normal activity. The patient is experiencing no pain. Associated symptoms include lower extremity edema, malaise/fatigue and shortness of breath. Pertinent negatives include no abdominal pain, no back pain, no diaphoresis, no dizziness, no exertional chest pressure, no fever, no headaches, no leg pain, no nausea, no numbness, no vomiting and no weakness. She has tried nothing for the symptoms. Risk factors include cardiac disease and hypertension.  Her past medical history is significant for HTN.     3/8/2019  Cc: dyspnea  Progressive dyspnea lower ext edema and orthopnea last few days especially. Ventricular response low at primary care today and sent to er. She has afib chronically on bb rate control (unclear if toprol xl 50 daily or met. tartrate 50 bid), and aspirin. No cva prophyl with anticoag. Dure to age fall risk. She has cxr and exam c/w acute chf systolic vs diastolic. No hx of echo. She has k of 3.5 and acute renal insuff creatinine 1.21 baseline <1 but she is 89 y.0. She is to be admitted re: acute new chf, acute renal insuff. Observation for now. Past Medical History/Comorbidities:   Ms. Julio C Lopez  has a past medical history of CAD (coronary artery disease) (1/22/2013), Chronic atrial fibrillation ([facility] [address] (1/22/2013), CRI (chronic renal insufficiency), stage 3 (moderate) ([facility] [address] Gait disturbance (1/22/2013), Mixed hyperlipidemia (1/22/2013), and Unspecified essential hypertension (1/22/2013). Ms. Julio C Lopez  has a past surgical history that includes hx colonoscopy (1996) and hx appendectomy. Social History/Living Environment:   Home Environment: Private residence  # Steps to Enter: 0  One/Two Story Residence: One story  Living Alone: Yes  Support Systems: Family member(s)  Patient Expects to be Discharged to[de-identified] Private residence  Current DME Used/Available at Home: Raised toilet seat, Walker, rolling  Prior Level of Function/Work/Activity:   She lives alone in a single story home and typically ambulates modified independently with rollator walker and performs ADLs without assistance. She has a raised toilet seat at home and reports 0 falls in the past 6 months. Family members drive her and manage her medications.      Number of Personal Factors/Comorbidities that affect the Plan of Care: 3+: HIGH COMPLEXITY   EXAMINATION:   Most Recent Physical Functioning:   Gross Assessment:  AROM: Generally decreased, functional  PROM: Within functional limits  Strength: Generally decreased, functional  Coordination: Generally decreased, functional  Tone: Normal  Sensation: Intact               Posture:  Posture (WDL): Exceptions to WDL  Posture Assessment: Forward head, Kyphosis, Rounded shoulders  Balance:  Sitting: Intact  Standing: Impaired  Standing - Static: Good  Standing - Dynamic : Fair Bed Mobility:     Wheelchair Mobility:     Transfers:  Sit to Stand: Stand-by assistance  Stand to Sit: Contact guard assistance  Interventions: Safety awareness training;Manual cues; Verbal cues; Visual cues  Gait:     Base of Support: Widened  Speed/Jessica: Slow  Distance (ft): 100 Feet (ft)  Assistive Device: Walker, rolling  Ambulation - Level of Assistance: Stand-by assistance;Contact guard assistance  Interventions: Safety awareness training;Verbal cues; Visual/Demos      Body Structures Involved:  1. Nerves  2. Heart  3. Lungs  4. Muscles Body Functions Affected:  1. Cardio  2. Respiratory  3. Neuromusculoskeletal  4. Movement Related Activities and Participation Affected:  1. Mobility  2. Self Care  3. Domestic Life  4. Interpersonal Interactions and Relationships  5. Community, Social and [place]   Number of elements that affect the Plan of Care: 4+: HIGH COMPLEXITY   CLINICAL PRESENTATION:   Presentation: Evolving clinical presentation with changing clinical characteristics: MODERATE COMPLEXITY   CLINICAL DECISION MAKIN [facility] Inpatient Short Form  How much difficulty does the patient currently have. .. Unable A Lot A Little None   1. Turning over in bed (including adjusting bedclothes, sheets and blankets)? [] 1   [] 2   [x] 3   [] 4   2. Sitting down on and standing up from a chair with arms ( e.g., wheelchair, bedside commode, etc.)   [] 1   [] 2   [x] 3   [] 4   3. Moving from lying on back to sitting on the side of the bed? [] 1   [] 2   [x] 3   [] 4   How much help from another person does the patient currently need. .. Total A Lot A Little None   4.   Moving to and from a bed to a chair (including a wheelchair)? [] 1   [] 2   [x] 3   [] 4   5. Need to walk in hospital room? [] 1   [] 2   [x] 3   [] 4   6. Climbing 3-5 steps with a railing? [] 1   [x] 2   [] 3   [] 4   © 2007, Trustees of [facility] [employer], under license to [employer]. All rights reserved      Score:  Initial: 17 Most Recent: X (Date: -- )    Interpretation of Tool:  Represents activities that are increasingly more difficult (i.e. Bed mobility, Transfers, Gait). Medical Necessity:     · Patient demonstrates excellent rehab potential due to higher previous functional level. Reason for Services/Other Comments:  · Patient continues to require modification of therapeutic interventions to increase complexity of exercises. Use of outcome tool(s) and clinical judgement create a POC that gives a: Questionable prediction of patient's progress: MODERATE COMPLEXITY            TREATMENT:   (In addition to Assessment/Re-Assessment sessions the following treatments were rendered)   Pre-treatment Symptoms/Complaints:  none  Pain: Initial:   Pain Intensity 1: 0  Post Session:  0/10     Therapeutic Activity: (    38 minutes): Therapeutic activities including Chair transfers, Ambulation on level ground and static/dynamic standing balance activities and sit<>stand transfers x6 to improve mobility, strength, balance and activity tolerance. Required minimal Safety awareness training;Verbal cues; Visual/Demos to promote static and dynamic balance in standing. Therapeutic Exercise: (  20 minutes):  Exercises per grid below to improve mobility, strength, balance and coordination. Required minimal visual and verbal cues to promote proper body breathing techniques. Progressed complexity of movement as indicated.      Date:  3/13/19 Date:   Date:     Activity/Exercise Parameters Parameters Parameters   Ankle pumps x20 B A     LAQ x15 B A     Seated marching x15 B A     Seated hip abduction x15 B A                         Braces/Orthotics/Lines/Etc: · O2 Device: Room air  Treatment/Session Assessment:    · Response to Treatment:  Patient reported more confidence in her mobility, ambulated with CGA/' with RW.  · Interdisciplinary Collaboration:   o Physical Therapist  o Registered Nurse  · After treatment position/precautions:   o Up in chair  o Bed/Chair-wheels locked  o Bed in low position  o Call light within reach  o RN notified   · Compliance with Program/Exercises: Will assess as treatment progresses  · Recommendations/Intent for next treatment session: \"Next visit will focus on advancements to more challenging activities and reduction in assistance provided\".   Total Treatment Duration:  PT Patient Time In/Time Out  Time In: 1010  Time Out: [address], DPT

## 2019-03-13 NOTE — PROGRESS NOTES
Spoke with MD Lázaro Anguiano about patient having red/maroon bloody stool last night per night shift RN report. Received orders to hold lovennox. MD to assess patient. Hgb stable 10.3. Patient had soft/loose brown BM today. No blood visible.

## 2019-03-13 NOTE — PROGRESS NOTES
Problem: Falls - Risk of  Goal: *Absence of Falls  Document Chuy Fall Risk and appropriate interventions in the flowsheet. Outcome: Progressing Towards Goal  Fall Risk Interventions:  Mobility Interventions: Bed/chair exit alarm, Communicate number of staff needed for ambulation/transfer, Patient to call before getting OOB, PT Consult for mobility concerns, PT Consult for assist device competence    Mentation Interventions: Bed/chair exit alarm, Door open when patient unattended, Family/sitter at bedside, Update white board    Medication Interventions: Bed/chair exit alarm, Patient to call before getting OOB, Teach patient to arise slowly    Elimination Interventions: Bed/chair exit alarm, Call light in reach, Patient to call for help with toileting needs       Patient progressing towards goal with no falls on current admission. Patient without confusion, agitation, or sensory perception deficits. Patient has weak and unsteady gait on ambulation requiring a heavy one person assist to the [facility]. Personal belongings are within reach. Bed is in the low and locked position with side rails up x3 and [place] bed alarm active. Yellow gripper socks to bilateral feet. Call light within reach and patient verbalizes understanding of use.

## 2019-03-13 NOTE — PROGRESS NOTES
Bedside and Verbal shift change report given to self (oncoming nurse) by Souleymane Timmons RN (offgoing nurse). Report included the following information SBAR, Kardex and MAR. PT sleeping in bed, son in room, no distress, bed alarm on.

## 2019-03-13 NOTE — PROGRESS NOTES
Bedside and Verbal shift change report given to self (oncoming nurse) by Kiel Pleitez RN (offgoing nurse). Report included the following information SBAR, Kardex, ED Summary, Procedure Summary, Intake/Output, MAR and Recent Results. At bedside report patient requested to use the [facility]. She was assisted to do so and then returned to bed and bed alarm in place was re-activated.

## 2019-03-13 NOTE — PROGRESS NOTES
Problem: Falls - Risk of  Goal: *Absence of Falls  Document Chuy Fall Risk and appropriate interventions in the flowsheet.   Outcome: Progressing Towards Goal  Fall Risk Interventions:  Mobility Interventions: Bed/chair exit alarm, Communicate number of staff needed for ambulation/transfer, Patient to call before getting OOB    Mentation Interventions: Bed/chair exit alarm, Room close to nurse's station, More frequent rounding, Toileting rounds    Medication Interventions: Assess postural VS orthostatic hypotension, Bed/chair exit alarm, Patient to call before getting OOB, Teach patient to arise slowly    Elimination Interventions: Bed/chair exit alarm, Toileting schedule/hourly rounds, Patient to call for help with toileting needs, Call light in reach

## 2019-03-13 NOTE — PROGRESS NOTES
Progress Note    Patient: Lucy Alonzo MRN: [id number]  SSN: [id number]    YOB: 1926  Age: 80 y.o. Sex: female      Admit Date: 3/8/2019    LOS: 2 days     Subjective:     CC: SOB    81 YO female patient admitted with CHF exacerbation. Awaiting rehab placement. Some BRBPR has been reported by nursing. Patient reported a previous history of hemorrhoids. Will hold lovenox. Hgb stable. Will monitor. Objective:     Vitals:    03/13/19 0139 03/13/19 0436 03/13/19 0924 03/13/19 1400   BP: 154/64 134/54 165/87 134/67   Pulse: 69 (!) 52 71 60   Resp: 23 18 16 20   Temp: 97.7 °F (36.5 °C) 98.1 °F (36.7 °C) 97.7 °F (36.5 °C) 98.4 °F (36.9 °C)   SpO2: 98% 96% 99% 99%   Weight:  67.4 kg (148 lb 11.2 oz)     Height:            Intake and Output:  Current Shift: No intake/output data recorded. Last three shifts: 03/11 1901 - 03/13 0700  In: 1200 [P.O.:1200]  Out: 2525 [Urine:2525]    Physical Exam:     General:                    The patient is a pleasant elderly female in no acute distress. Head:                                   Normocephalic/atraumatic. Eyes:                                   No palpebral pallor or scleral icterus. ENT:                                    External auricular and nasal exam within normal limits. Mucous membranes are moist.  Neck:                                   Supple, non-tender, no JVD. Lungs:                       Clear to auscultation bilaterally without wheezes or crackles. Decreased breath sounds at bases. No respiratory distress or accessory muscle use. Heart:                                  irregular rate and rhythm, without murmurs, rubs, or gallops. Abdomen:                  Soft, non-tender, mildly distended with normoactive bowel sounds. Genitourinary:           No tenderness over the bladder or bilateral CVAs.   Extremities:               Without clubbing, cyanosis, mild edema. Skin:                                    Normal color, texture, and turgor. No rashes, lesions, or jaundice. Pulses:                      Radial and dorsalis pedis pulses present 2+ bilaterally. Capillary refill <2s. Neurologic:                CN II-XII grossly intact and symmetrical.                                               Moving all four extremities well with no focal deficits. Psychiatric:                Pleasant demeanor, appropriate affect. Alert and oriented x 3      Lab/Data Review:    XR chest   3-8-2019  IMPRESSION: Cardiomegaly and pulmonary edema. CHF exacerbation with small  pleural effusions. Echo   3-8-2019  SUMMARY:    -  Left ventricle: Systolic function was hyperdynamic. Ejection fraction was  estimated in the range of 65 % to 70 %. This study was inadequate for the  evaluation of regional wall motion. -  Left atrium: The atrium was markedly dilated. -  Right atrium: The atrium was markedly dilated. -  Inferior vena cava, hepatic veins: The inferior vena cava was mildly  dilated. The respirophasic change in diameter was less than 50%. -  Mitral valve: There was mild annular calcification. There was moderate  thickening of the anterior leaflet. There was moderate regurgitation. -  Tricuspid valve: There was moderate regurgitation. I have reviewed chest x-ray and ECG myself.       Current Facility-Administered Medications:     bisacodyl (DULCOLAX) tablet 5 mg, 5 mg, Oral, DAILY PRN, Linus Cruz MD, 5 mg at 03/12/19 1319    furosemide (LASIX) tablet 40 mg, 40 mg, Oral, DAILY, Anibal Gutierrez MD, 40 mg at 03/13/19 3046    spironolactone (ALDACTONE) tablet 25 mg, 25 mg, Oral, DAILY, Anibal Gutierrez MD, 25 mg at 03/13/19 0926    sodium chloride (NS) flush 5-40 mL, 5-40 mL, IntraVENous, Q8H, Dean Rojo DO, 5 mL at 03/13/19 0546    sodium chloride (NS) flush 5-40 mL, 5-40 mL, IntraVENous, PRN, Rojo, Hali Pipes, DO    acetaminophen (TYLENOL) tablet 650 mg, 650 mg, Oral, Q4H PRN, Sophronia Cabool, DO, 650 mg at 03/12/19 2129    HYDROcodone-acetaminophen (NORCO) 5-325 mg per tablet 1 Tab, 1 Tab, Oral, Q4H PRN, Darrel Hali Pipes, DO    naloxone [facility]) injection 0.4 mg, 0.4 mg, IntraVENous, PRN, Darrel Hali Pipes, DO    lisinopril (PRINIVIL, ZESTRIL) tablet 20 mg, 20 mg, Oral, BID, Rojo, Hali Pipes, DO, 20 mg at 03/13/19 4462    atorvastatin (LIPITOR) tablet 40 mg, 40 mg, Oral, DAILY, Rojo, Hali Pipes, DO, 40 mg at 03/13/19 1404    aspirin delayed-release tablet 81 mg, 81 mg, Oral, DAILY, Darrel Hali Pipes, DO, 81 mg at 03/13/19 0926    hydrALAZINE (APRESOLINE) 20 mg/mL injection 10 mg, 10 mg, IntraVENous, Q8H PRN, Darrel Hali Pipes, DO, 10 mg at 03/08/19 1419    amLODIPine (NORVASC) tablet 5 mg, 5 mg, Oral, DAILY, Yu Im D, NP, 5 mg at 03/13/19 0687    lip protectant (BLISTEX) ointment, , Topical, PRN, Bobbette Phalen, MD    benzonatate (TESSALON) capsule 100 mg, 100 mg, Oral, TID PRN, Robert Hernandez MD, 100 mg at 03/12/19 2136        Assessment:     Principal Problem:    CHF [facility]congestive heart failure) ([facility] (3/8/2019)    Active Problems:    Mixed hyperlipidemia (1/22/2013)      Hypertension (1/22/2013)      Chronic atrial fibrillation ([facility] (1/22/2013)      Overview: Refuses intervention, anticoagulation etc        Plan:     Acute diastolic CHF with pulmonary edema. Improved with diuretic. Continue PO Lasix. Bradycardia is resolved after discontinuation of beta blocker. ECHO showed EF is preserved. Hypertension  Continue home medications. Hyperlipidemia  Continue home medications. I have discussed the plan of care with patient at bedside.      DVT prophylaxis : BCD        Signed By: Eusebio Null MD     March 13, 2019

## 2019-03-13 NOTE — PROGRESS NOTES
Patient noted to have a bowel movement with blood in it. Patient's VS stable, color appropriate for ethnicity, and patient reporting no pain or discomfort. Called and discussed with Dr. Marielena De La Rosa and will continue to monitor patient.  CBC already ordered for the AM.

## 2019-03-13 NOTE — PROGRESS NOTES
Bedside and Verbal shift change report given to Vanessa Caceres RN (oncoming nurse) by self Marlo banda). Report included the following information SBAR, Kardex, ED Summary, Procedure Summary, Intake/Output, MAR, Recent Results and Cardiac Rhythm a-fib.

## 2019-03-14 ENCOUNTER — PATIENT OUTREACH (OUTPATIENT)
Dept: CASE MANAGEMENT | Age: 84
End: 2019-03-14

## 2019-03-14 VITALS
HEIGHT: 63 IN | OXYGEN SATURATION: 92 % | HEART RATE: 53 BPM | DIASTOLIC BLOOD PRESSURE: 53 MMHG | RESPIRATION RATE: 20 BRPM | SYSTOLIC BLOOD PRESSURE: 132 MMHG | TEMPERATURE: 98.3 F | BODY MASS INDEX: 26.19 KG/M2 | WEIGHT: 147.8 LBS

## 2019-03-14 LAB
ANION GAP SERPL CALC-SCNC: 8 MMOL/L (ref 7–16)
BUN SERPL-MCNC: 21 MG/DL (ref 8–23)
CALCIUM SERPL-MCNC: 8.1 MG/DL (ref 8.3–10.4)
CHLORIDE SERPL-SCNC: 101 MMOL/L (ref 98–107)
CO2 SERPL-SCNC: 27 MMOL/L (ref 21–32)
CREAT SERPL-MCNC: 1.12 MG/DL (ref 0.6–1)
ERYTHROCYTE [DISTWIDTH] IN BLOOD BY AUTOMATED COUNT: 18.1 % (ref 11.9–14.6)
GLUCOSE SERPL-MCNC: 102 MG/DL (ref 65–100)
HCT VFR BLD AUTO: 31.8 % (ref 35.8–46.3)
HGB BLD-MCNC: 9.8 G/DL (ref 11.7–15.4)
MCH RBC QN AUTO: 26.5 PG (ref 26.1–32.9)
MCHC RBC AUTO-ENTMCNC: 30.8 G/DL (ref 31.4–35)
MCV RBC AUTO: 85.9 FL (ref 79.6–97.8)
NRBC # BLD: 0 K/UL (ref 0–0.2)
PLATELET # BLD AUTO: 124 K/UL (ref 150–450)
PMV BLD AUTO: 10.2 FL (ref 9.4–12.3)
POTASSIUM SERPL-SCNC: 4.7 MMOL/L (ref 3.5–5.1)
RBC # BLD AUTO: 3.7 M/UL (ref 4.05–5.2)
SODIUM SERPL-SCNC: 136 MMOL/L (ref 136–145)
WBC # BLD AUTO: 5.9 K/UL (ref 4.3–11.1)

## 2019-03-14 PROCEDURE — 74011250637 HC RX REV CODE- 250/637: Performed by: INTERNAL MEDICINE

## 2019-03-14 PROCEDURE — 80048 BASIC METABOLIC PNL TOTAL CA: CPT

## 2019-03-14 PROCEDURE — 74011250637 HC RX REV CODE- 250/637: Performed by: NURSE PRACTITIONER

## 2019-03-14 PROCEDURE — 97535 SELF CARE MNGMENT TRAINING: CPT

## 2019-03-14 PROCEDURE — 85027 COMPLETE CBC AUTOMATED: CPT

## 2019-03-14 PROCEDURE — 36415 COLL VENOUS BLD VENIPUNCTURE: CPT

## 2019-03-14 RX ORDER — FUROSEMIDE 40 MG/1
40 TABLET ORAL DAILY
Qty: 30 TAB | Refills: 0 | Status: SHIPPED | OUTPATIENT
Start: 2019-03-14 | End: 2019-04-03

## 2019-03-14 RX ORDER — TRAMADOL HYDROCHLORIDE 50 MG/1
50 TABLET ORAL
Qty: 15 TAB | Refills: 0 | Status: SHIPPED | OUTPATIENT
Start: 2019-03-14 | End: 2019-03-17

## 2019-03-14 RX ORDER — LISINOPRIL 30 MG/1
30 TABLET ORAL DAILY
Qty: 30 TAB | Refills: 0 | Status: SHIPPED | OUTPATIENT
Start: 2019-03-14 | End: 2019-04-03

## 2019-03-14 RX ORDER — ACETAMINOPHEN 325 MG/1
650 TABLET ORAL
Qty: 30 TAB | Refills: 0 | Status: SHIPPED | OUTPATIENT
Start: 2019-03-14

## 2019-03-14 RX ORDER — LISINOPRIL 20 MG/1
20 TABLET ORAL 2 TIMES DAILY
Qty: 60 TAB | Refills: 1 | Status: SHIPPED | OUTPATIENT
Start: 2019-03-14 | End: 2019-03-14

## 2019-03-14 RX ORDER — SPIRONOLACTONE 25 MG/1
25 TABLET ORAL DAILY
Qty: 30 TAB | Refills: 0 | Status: SHIPPED | OUTPATIENT
Start: 2019-03-15 | End: 2019-04-03

## 2019-03-14 RX ADMIN — FUROSEMIDE 40 MG: 40 TABLET ORAL at 09:06

## 2019-03-14 RX ADMIN — ATORVASTATIN CALCIUM 40 MG: 40 TABLET, FILM COATED ORAL at 09:06

## 2019-03-14 RX ADMIN — ASPIRIN 81 MG: 81 TABLET, COATED ORAL at 09:04

## 2019-03-14 RX ADMIN — AMLODIPINE BESYLATE 5 MG: 5 TABLET ORAL at 09:04

## 2019-03-14 RX ADMIN — Medication 5 ML: at 06:07

## 2019-03-14 RX ADMIN — SPIRONOLACTONE 25 MG: 25 TABLET ORAL at 09:06

## 2019-03-14 RX ADMIN — LISINOPRIL 20 MG: 20 TABLET ORAL at 09:06

## 2019-03-14 NOTE — PROGRESS NOTES
Care Management Interventions  PCP Verified by CM: Yes  Palliative Care Criteria Met (RRAT>21 & CHF Dx)?: Yes(RRAT 21 Dx CHF)  Mode of Transport at Discharge: BLS([facility] ambulance )  Transition of Care Consult (CM Consult): SNF  Partner SNF: Yes  Discharge Durable Medical Equipment: No(RW with seat and elevated toilet seat)  Physical Therapy Consult: Yes  Occupational Therapy Consult: No  Speech Therapy Consult: No  Current Support Network: Lives Alone  Confirm Follow Up Transport: Family  Plan discussed with Pt/Family/Caregiver: Yes  Freedom of Choice Offered: Yes  Discharge Location  Discharge Placement: Skilled nursing facility  Patient medically ready for d/c. Patient and son aware of d/c and agree with d/[facility] to [facility] room [facility]. Request ambulance transfer. [address] ambulance set up with  time 1215 pm. SNF packet to send with ambulance staff. Patient to d/c to [facility].

## 2019-03-14 NOTE — DISCHARGE SUMMARY
Discharge Summary     Patient: Cruz Zamorano MRN: [id number]  SSN: [id number]    YOB: 1926  Age: 80 y.o. Sex: female       Admit Date: 3/8/2019    Discharge Date: 3/14/2019      Admission Diagnoses: [facility]cong[facility] [I50.9]  CHF (congestive heart failure) ([facility] [I50.9]    Discharge Diagnoses:   Problem List as of 3/14/2019 Date Reviewed: 3/8/2019          Codes Class Noted - Resolved    Vitamin B12 deficiency ICD-10-CM: E53.8  ICD-9-CM: 266.2  9/9/2014 - Present        RESOLVED: Weight gain ICD-10-CM: R63.5  ICD-9-CM: 783.1  12/8/2015 - 7/18/2016        Shortness of breath ICD-10-CM: R06.02  ICD-9-CM: 786.05  3/8/2019 - Present        Bradycardia ICD-10-CM: R00.1  ICD-9-CM: 427.89  3/8/2019 - Present        * (Principal) CHF (congestive heart failure) (HCC) ICD-10-CM: I50.9  ICD-9-CM: 428.0  3/8/2019 - Present        CRI (chronic renal insufficiency), stage 3 (moderate) (HCC) ICD-10-CM: N18.3  ICD-9-CM: 976. 3  Unknown - Present        MCI (mild cognitive impairment) ICD-10-CM: G31.84  ICD-9-CM: 331.83  7/18/2013 - Present    Overview Addendum 12/8/2015  3:42 PM by Courtney Childs MD     MMSE 12/8/2015 27/30 with abnormal clock, Probable early dementia, eval July 2013 abnormal clock, remembers 1/3 words 5 min later, repeat eval Jan 2014 MMSE 27/30, abnormal clock and pentagons, misses one memory word and one letter backwards WORLD, eval 12/2/2014 MMSE 26/30 with abnormal clock but remembers all 3 words 5 min later             Carotid atherosclerosis ICD-10-CM: I65.29  ICD-9-CM: 433.10  1/23/2013 - Present    Overview Signed 1/23/2013  9:33 PM by Courtney Childs MD     Bilateral, severe, refuses surgical eval             Frail elderly ICD-10-CM: R54  ICD-9-CM: 564  1/23/2013 - Present    Overview Signed 1/24/2013  4:05 PM by Courtney Childs MD     Lives alone, no assistive devices, pays own bills, no longer drives             Mixed hyperlipidemia ICD-10-CM: E78.2  ICD-9-CM: 272.2 1/22/2013 - Present        Hypertension ICD-10-CM: I10  ICD-9-CM: 401.9  1/22/2013 - Present        Chronic atrial fibrillation ([facility] ICD-10-CM: I48.2  ICD-9-CM: 427.31  1/22/2013 - Present    Overview Signed 1/23/2013  9:32 PM by Courtney Childs MD     Refuses intervention, anticoagulation etc             Gait disturbance ICD-10-CM: R26.9  ICD-9-CM: 781.2  1/22/2013 - Present    Overview Signed 1/23/2013  9:32 PM by Courtney Childs MD     Possible mild peripheral neuropathy             RESOLVED: Iron deficiency anemia, unspecified ICD-10-CM: D50.9  ICD-9-CM: 280.9  7/18/2013 - 6/2/2015               Discharge Condition: [facility] Course: This is a 79 YO female patient with a PMH of Afib NOT on anticoagulation, HTN who was admitted on 3/8/19 with SOB, weight gain and Afib with slow VR ( Hr below 40 bpm). She was considered to have acute CHF possibly induced by bradycardia. Her Cr level was slightly elevated and was considered to have mild CLAUDE. Her BB was stopped and she was started on IV lasix. Her HR improved and her fluid overload resolved. She was started on oral lasix and aldactone and her Cr level and K level remained stable. An ECHO showed normal LVEF. She was seen by cardiology during this admission. She requested being discharged to inpatient rehab. She is being discharged OFF B-blockers due to symptomatic bradycardia. She will use lisinopril, norvasc, lasix and aldactone. A BMP should be done in the next 5-7 days to monitor her Cr level and K level. PE:    Physical Exam   Constitutional: She is oriented to person, place, and time. Frail elderly patient    HENT:   Head: Normocephalic. Eyes: Pupils are equal, round, and reactive to light. Neck: Normal range of motion. Cardiovascular:   Irregular rate and rhythm    Pulmonary/Chest: Breath sounds normal.   Abdominal: Soft. Musculoskeletal: Normal range of motion. Neurological: She is alert and oriented to person, place, and time.  GCS score is 15.   Skin: Skin is warm. Psychiatric: Affect normal.         Consults: Cardiology    Significant Diagnostic Studies: see hospital course     Disposition: rehab     Discharge Medications:   Current Discharge Medication List      START taking these medications    Details   acetaminophen (TYLENOL) 325 mg tablet Take 2 Tabs by mouth every six (6) hours as needed for Pain or Fever. Qty: 30 Tab, Refills: 0      furosemide (LASIX) 40 mg tablet Take 1 Tab by mouth daily. Qty: 30 Tab, Refills: 0      spironolactone (ALDACTONE) 25 mg tablet Take 1 Tab by mouth daily. Qty: 30 Tab, Refills: 0      traMADol (ULTRAM) 50 mg tablet Take 1 Tab by mouth two (2) times daily as needed for Pain for up to 3 days. Max Daily Amount: 100 mg. Qty: 15 Tab, Refills: 0    Associated Diagnoses: Pleural effusion      lisinopril (PRINIVIL, ZESTRIL) 30 mg tablet Take 1 Tab by mouth daily. Qty: 30 Tab, Refills: 0         CONTINUE these medications which have NOT CHANGED    Details   amLODIPine (NORVASC) 2.5 mg tablet Take 1 Tab by mouth daily. Qty: 30 Tab, Refills: 12      ferrous sulfate 325 mg (65 mg iron) tablet One pill every other day  Qty: 45 Tab, Refills: 3      atorvastatin (LIPITOR) 40 mg tablet Take 1 Tab by mouth daily. Qty: 30 Tab, Refills: 11      cyanocobalamin (VITAMIN B-12) 1,000 mcg tablet Take 1 tablet by mouth daily. Qty: 90 tablet, Refills: 3      aspirin delayed-release 81 mg tablet Take  by mouth daily.            STOP taking these medications       benazepril-hydroCHLOROthiazide (LOTENSIN HCT) 20-12.5 mg per tablet Comments:   Reason for Stopping:         metoprolol tartrate (LOPRESSOR) 50 mg tablet Comments:   Reason for Stopping:               Activity: Activity as tolerated  Diet: Cardiac Diet  Wound Care: None needed    Follow-up Appointments   Procedures    FOLLOW UP VISIT Appointment in: Ten Days cardiology     cardiology     Standing Status:   Standing     Number of Occurrences:   1     Order Specific Question:   Appointment in     Answer:   Ten Days    FOLLOW UP VISIT Appointment in: One Week PCP     PCP     Standing Status:   Standing     Number of Occurrences:   1     Standing Expiration Date:   3/15/2019     Order Specific Question:   Appointment in     Answer:    One Week       Signed By: Polo Medina MD     March 14, 2019

## 2019-03-14 NOTE — PROGRESS NOTES
TRANSFER - OUT REPORT:    Verbal report given to Sobia Rene RN(name) on [facility]  being transferred to [place](unit) for routine progression of care       Report consisted of patients Situation, Background, Assessment and   Recommendations(SBAR). Information from the following report(s) SBAR was reviewed with the receiving nurse. Lines:       Opportunity for questions and clarification was provided.       Patient transported with:   [employer]

## 2019-03-14 NOTE — PROGRESS NOTES
Problem: Falls - Risk of  Goal: *Absence of Falls  Document Chuy Fall Risk and appropriate interventions in the flowsheet. Outcome: Progressing Towards Goal  Fall Risk Interventions:  Mobility Interventions: Bed/chair exit alarm, Communicate number of staff needed for ambulation/transfer, Patient to call before getting OOB, PT Consult for mobility concerns, PT Consult for assist device competence, OT consult for ADLs    Mentation Interventions: Bed/chair exit alarm, Adequate sleep, hydration, pain control, Door open when patient unattended, Increase mobility, More frequent rounding, Room close to nurse's station, Update white board    Medication Interventions: Bed/chair exit alarm, Patient to call before getting OOB, Teach patient to arise slowly    Elimination Interventions: Bed/chair exit alarm, Call light in reach, Patient to call for help with toileting needs       Patient progressing towards goal with no falls on current admission. Patient without confusion, agitation, or sensory perception deficits. Patient has weak and unsteady gait on ambulation. Personal belongings are within reach. Bed is in the low and locked position with side rails up x3 and [place] Bed alarm in place and active. Yellow gripper socks to bilateral feet. Call light within reach and patient verbalizes understanding of use.

## 2019-03-14 NOTE — PROGRESS NOTES
1. Patient will complete lower body bathing and dressing with min A and adaptive equipment as needed. 2. Patient will complete toileting with supervision. 3. Patient will tolerate 23 minutes of OT treatment with 2-3 rest breaks to increase activity tolerance for ADLs. (MET)  4. Patient will complete functional transfers with MOD I and adaptive equipment as needed. 5. Patient will complete functional mobility for household distances with SBA and adaptive equipment as needed. 6. Patient will tolerate 10 minutes BUE strengthening to increase activity tolerance for ADLs    Timeframe: 7 visits       OCCUPATIONAL THERAPY: Daily Note and AM 3/14/2019  INPATIENT: OT Visit Days: 2  [place] / [place] / Product Type: Medicare /      NAME/AGE/GENDER: Barbara Nichols is a 80 y.o. female   PRIMARY DIAGNOSIS:  CHF (congestive heart failure) ([facility]) [I50.9]  CHF (congestive heart failure) ([facility]) [I50.9] CHF (congestive heart failure) ([facility]) [facility]congest[facility] ([facility]       ICD-10: Treatment Diagnosis:    · Generalized Muscle Weakness (M62.81)   Precautions/Allergies:    fall precautions Patient has no known allergies. ASSESSMENT:     Ms. Joann Orlando presents for CHF. Upon arrival, pt sitting upright in chair with son and RN at bedside. Pt alert, oriented x 4, and agreeable to OT session. Pt stood with min assistance, cues for hand placement. Pt completed mobility around room and bathroom with CGA using RW, ADLs at sink with CGA, extra time for all. Pt educated at length on fall prevention techniques including positioning, safe hand placement, use of RW, and functional approaches for ADLs. Pt tolerated session well w/o c/o fatigue. Pt remained up in chair, needs in reach. Pt is progressing towards goals, continue POC. This section established at most recent assessment   PROBLEM LIST (Impairments causing functional limitations):  1. Decreased Strength  2.  Decreased ADL/Functional Activities  3. Decreased Transfer Abilities  4. Decreased Ambulation Ability/Technique  5. Decreased Balance  6. Decreased Activity Tolerance  7. Increased Fatigue  8. Decreased [place] with Home Exercise Program   INTERVENTIONS PLANNED: (Benefits and precautions of occupational therapy have been discussed with the patient.)  1. Activities of daily living training  2. Adaptive equipment training  3. Balance training  4. Clothing management  5. Community reintergration  6. Donning&doffing training  7. Neuromuscular re-eduation  8. Re-evaluation  9. Therapeutic activity  10. Therapeutic exercise     TREATMENT PLAN: Frequency/Duration: Follow patient 3x/week to address above goals. Rehabilitation Potential For Stated Goals: Good     RECOMMENDED REHABILITATION/EQUIPMENT: (at time of discharge pending progress): Due to the probability of continued deficits (see above) this patient will likely need continued skilled occupational therapy after discharge. Equipment:    TBD              OCCUPATIONAL PROFILE AND HISTORY:   History of Present Injury/Illness (Reason for Referral):  See H&P  Past Medical History/Comorbidities:   Ms. Milvia Hendrickson  has a past medical history of CAD (coronary artery disease) (1/22/2013), Chronic atrial fibrillation ([address] (1/22/2013), CRI (chronic renal insufficiency), stage 3 (moderate) ([address] Gait disturbance (1/22/2013), Mixed hyperlipidemia (1/22/2013), and Unspecified essential hypertension (1/22/2013). Ms. Milvia Hendrickson  has a past surgical history that includes hx colonoscopy (1996) and hx appendectomy.   Social History/Living Environment:   Home Environment: Private residence  # Steps to Enter: 0  One/Two Story Residence: One story  Living Alone: Yes  Support Systems: Family member(s)  Patient Expects to be Discharged to[de-identified] Private residence  Current DME Used/Available at Home: Raised toilet seat, Walker, rolling  Prior Level of Function/Work/Activity:  Independent with ADLs; had Meals on Wheels; family provides transport; uses rollator for fx mobility. Dominant Side:         RIGHT  Personal Factors:          Sex:  female        Age:  80 y.o. Other factors that influence how disability is experienced by the patient:  Multiple co-morbidities    Number of Personal Factors/Comorbidities that affect the Plan of Care: Brief history (0):  LOW COMPLEXITY   ASSESSMENT OF OCCUPATIONAL PERFORMANCE[de-identified]   Activities of Daily Living:   Basic ADLs (From Assessment) Complex ADLs (From Assessment)   Feeding: Supervision, Setup  Oral Facial Hygiene/Grooming: Supervision, Setup  Bathing: Moderate assistance  Upper Body Dressing: Minimum assistance  Lower Body Dressing: Moderate assistance  Toileting: Minimum assistance Instrumental ADL  Meal Preparation: Maximum assistance  Homemaking: Maximum assistance   Grooming/Bathing/Dressing Activities of Daily Living   Grooming  Grooming Assistance: Contact guard assistance Cognitive Retraining  Safety/Judgement: Awareness of environment; Fall prevention                 Functional Transfers  Bathroom Mobility: Contact guard assistance     Bed/Mat Mobility  Sit to Stand: Contact guard assistance;Minimum assistance  Stand to Sit: Contact guard assistance       Most Recent Physical Functioning:   Gross Assessment:                  Posture:  Posture (WDL): Exceptions to WDL  Posture Assessment:  Forward head, Kyphosis, Rounded shoulders  Balance:  Sitting: Intact  Standing: Impaired  Standing - Static: Good;Constant support  Standing - Dynamic : Fair Bed Mobility:     Wheelchair Mobility:     Transfers:  Sit to Stand: Contact guard assistance;Minimum assistance  Stand to Sit: Contact guard assistance            Patient Vitals for the past 6 hrs:   BP BP Patient Position SpO2 Pulse   03/14/19 0559 148/70 Sitting 99 % (!) 55       Mental Status  Neurologic State: Alert  Orientation Level: Oriented X4  Cognition: Follows commands  Perception: Appears intact  Perseveration: No perseveration noted  Safety/Judgement: Awareness of environment, Fall prevention                          Physical Skills Involved:  1. Balance  2. Strength  3. Activity Tolerance Cognitive Skills Affected (resulting in the inability to perform in a timely and safe manner): 1. none Psychosocial Skills Affected:  1. Habits/Routines  2. Environmental Adaptation   Number of elements that affect the Plan of Care: 3-5:  MODERATE COMPLEXITY   CLINICAL DECISION MAKING:   [facility] AM-PAC 6 Clicks   Daily Activity Inpatient Short Form  How much help from another person does the patient currently need. .. Total A Lot A Little None   1. Putting on and taking off regular lower body clothing? [] 1   [x] 2   [] 3   [] 4   2. Bathing (including washing, rinsing, drying)? [] 1   [x] 2   [] 3   [] 4   3. Toileting, which includes using toilet, bedpan or urinal?   [] 1   [] 2   [x] 3   [] 4   4. Putting on and taking off regular upper body clothing? [] 1   [] 2   [x] 3   [] 4   5. Taking care of personal grooming such as brushing teeth? [] 1   [] 2   [x] 3   [] 4   6. Eating meals? [] 1   [] 2   [x] 3   [] 4   © 2007, Trustees of [facility], under license to [employer]. All rights reserved      Score:  Initial: 16 Most Recent: X (Date: -- )    Interpretation of Tool:  Represents activities that are increasingly more difficult (i.e. Bed mobility, Transfers, Gait). Medical Necessity:     · Patient is expected to demonstrate progress in strength, balance, coordination and functional technique to decrease assistance required with ADLs and functional mobility. .  Reason for Services/Other Comments:  · Patient continues to require skilled intervention due to medical complications and patient unable to attend/participate in therapy as expected.    Use of outcome tool(s) and clinical judgement create a POC that gives a: LOW COMPLEXITY         TREATMENT:   (In addition to Assessment/Re-Assessment sessions the following treatments were rendered)     Pre-treatment Symptoms/Complaints:  \"I am feeling better today but I am just anxious about going home. \"  Pain: Initial:   Pain Intensity 1: 0 /10 Post Session:  same     Self Care: (26 min): Procedure(s) (per grid) utilized to improve and/or restore self-care/home management as related to grooming, endurance, and fall prevention techniques for ADLs. Required minimal verbal cueing to facilitate activities of daily living skills and compensatory activities. Braces/Orthotics/Lines/Etc:   · O2 Device: Room air  Treatment/Session Assessment:    · Response to Treatment:  Tolerated well with no issues noted. · Interdisciplinary Collaboration:   o Occupational Therapist  o Registered Nurse  · After treatment position/precautions:   o Up in chair  o Bed alarm/tab alert on  o Bed/Chair-wheels locked  o Call light within reach  o Family at bedside   · Compliance with Program/Exercises: Will assess as treatment progresses. · Recommendations/Intent for next treatment session: \"Next visit will focus on advancements to more challenging activities and reduction in assistance provided\".   Total Treatment Duration:  OT Patient Time In/Time Out  Time In: 1036  Time [facility]: [facility]

## 2019-03-14 NOTE — PROGRESS NOTES
Bedside and Verbal shift change report given to self (oncoming nurse) by Bailey Rosen RN (offgoing nurse). Report included the following information SBAR, Kardex, MAR and Recent Results.

## 2019-03-14 NOTE — PROGRESS NOTES
Bedside and Verbal shift change report given to Bharat Mallory RN (oncoming nurse) by self Ignacia Perry nurse). Report included the following information SBAR, Kardex, ED Summary, Procedure Summary, Intake/Output, MAR, Recent Results and Cardiac Rhythm a-fib (mainly in the 40s-50s).

## 2019-03-14 NOTE — PROGRESS NOTES
Following for CHF Bundle Payment    Patient Care Setting  Care Setting Type: SNF  Patient Care Setting: Floyd Manuel  [place] Date: 03/14/19  Start of Care: 03/14/19  Care Coordinator conducted hand-off?: Yes  Patient Care Plan: On Track  Comments: [facility] Health  will follow. Spoke with patient and son, Aaron Ruby, before discharge. We will follow up after discharge from [place]. Discussed expected LOS of 14-17 days. Patient would like all services through [facility] including home health.      Yasmeen Maharaj, RN- [facility], BSN  Care Transition/ Bundled Payment Navigator  [phone]

## 2019-03-15 ENCOUNTER — PATIENT OUTREACH (OUTPATIENT)
Dept: CASE MANAGEMENT | Age: 84
End: 2019-03-15

## 2019-03-15 NOTE — PROGRESS NOTES
This note will not be viewable in [address] Patient discharged to the [facility] on 3/14/19. OWEN outreach postponed for 21 days due to discharge to non-preferred network SNF.

## 2019-03-21 ENCOUNTER — PATIENT OUTREACH (OUTPATIENT)
Dept: CASE MANAGEMENT | Age: 84
End: 2019-03-21

## 2019-03-25 NOTE — PROGRESS NOTES
Following for CHF Bundle. Spoke with Deon AGUAYO at [place]. #[phone]. No anticipated discharge date at this time.

## 2019-04-02 ENCOUNTER — PATIENT OUTREACH (OUTPATIENT)
Dept: CASE MANAGEMENT | Age: 84
End: 2019-04-02

## 2019-04-02 NOTE — PROGRESS NOTES
Following for CHF Bundle. Left message with Yovanny Levin #[phone] at [facility] regarding discharge from rehab. Anticipate discharge home this week. Health  to follow in addition to home health.

## 2019-04-03 PROBLEM — I50.30 HEART FAILURE WITH PRESERVED EJECTION FRACTION (HCC): Status: ACTIVE | Noted: 2019-04-03

## 2019-04-05 ENCOUNTER — PATIENT OUTREACH (OUTPATIENT)
Dept: CASE MANAGEMENT | Age: 84
End: 2019-04-05

## 2019-04-05 NOTE — PROGRESS NOTES
This note will not be viewable in [address] Second OWEN outreach attempt made to patient's home number was unsuccessful. Left message to return call. Spoke with Davina Gray, admissions at the [address] [place] [address], patient remains currently admitted. She may possibly discharge next week. No OWEN indicated at this time.

## 2019-04-05 NOTE — PROGRESS NOTES
This note will not be viewable in [address] Initial OWEN outreach attempt to patient's home number was unsuccessful. Left message to return call. Will attempt second outreach within 24 hours.

## 2019-04-09 ENCOUNTER — HOME HEALTH ADMISSION (OUTPATIENT)
Dept: HOME HEALTH SERVICES | Facility: HOME HEALTH | Age: 84
End: 2019-04-09
Payer: MEDICARE

## 2019-04-10 ENCOUNTER — HOME CARE VISIT (OUTPATIENT)
Dept: SCHEDULING | Facility: HOME HEALTH | Age: 84
End: 2019-04-10
Payer: MEDICARE

## 2019-04-10 VITALS
TEMPERATURE: 98 F | HEART RATE: 67 BPM | RESPIRATION RATE: 13 BRPM | DIASTOLIC BLOOD PRESSURE: 70 MMHG | SYSTOLIC BLOOD PRESSURE: 122 MMHG | OXYGEN SATURATION: 98 %

## 2019-04-10 PROCEDURE — 3331090002 HH PPS REVENUE DEBIT

## 2019-04-10 PROCEDURE — 3331090001 HH PPS REVENUE CREDIT

## 2019-04-10 PROCEDURE — G0299 HHS/HOSPICE OF RN EA 15 MIN: HCPCS

## 2019-04-10 PROCEDURE — 400013 HH SOC

## 2019-04-11 ENCOUNTER — PATIENT OUTREACH (OUTPATIENT)
Dept: CASE MANAGEMENT | Age: 84
End: 2019-04-11

## 2019-04-11 PROCEDURE — 3331090001 HH PPS REVENUE CREDIT

## 2019-04-11 PROCEDURE — 3331090002 HH PPS REVENUE DEBIT

## 2019-04-11 NOTE — PROGRESS NOTES
Following for CHF Bundle. Discharged from [facility] and home with [facility], [facility]- admitted 4/10/19. Will schedule Health  home visit for Friday or next week. Health  will follow for 90 days- end date 6/12/19. Ivette Gray, RN- [facility], BSN Care Transition/ Bundled Payment Navigator [phone]

## 2019-04-12 ENCOUNTER — HOME CARE VISIT (OUTPATIENT)
Dept: SCHEDULING | Facility: HOME HEALTH | Age: 84
End: 2019-04-12
Payer: MEDICARE

## 2019-04-12 PROCEDURE — 3331090001 HH PPS REVENUE CREDIT

## 2019-04-12 PROCEDURE — G0151 HHCP-SERV OF PT,EA 15 MIN: HCPCS

## 2019-04-12 PROCEDURE — 3331090002 HH PPS REVENUE DEBIT

## 2019-04-13 PROCEDURE — 3331090002 HH PPS REVENUE DEBIT

## 2019-04-13 PROCEDURE — 3331090001 HH PPS REVENUE CREDIT

## 2019-04-14 ENCOUNTER — HOME CARE VISIT (OUTPATIENT)
Dept: HOME HEALTH SERVICES | Facility: HOME HEALTH | Age: 84
End: 2019-04-14
Payer: MEDICARE

## 2019-04-14 VITALS
SYSTOLIC BLOOD PRESSURE: 116 MMHG | DIASTOLIC BLOOD PRESSURE: 72 MMHG | HEART RATE: 52 BPM | RESPIRATION RATE: 18 BRPM | TEMPERATURE: 97.3 F

## 2019-04-14 PROCEDURE — 3331090001 HH PPS REVENUE CREDIT

## 2019-04-14 PROCEDURE — 3331090002 HH PPS REVENUE DEBIT

## 2019-04-15 ENCOUNTER — HOME CARE VISIT (OUTPATIENT)
Dept: HOME HEALTH SERVICES | Facility: HOME HEALTH | Age: 84
End: 2019-04-15
Payer: MEDICARE

## 2019-04-15 ENCOUNTER — HOME CARE VISIT (OUTPATIENT)
Dept: SCHEDULING | Facility: HOME HEALTH | Age: 84
End: 2019-04-15
Payer: MEDICARE

## 2019-04-15 VITALS
HEART RATE: 60 BPM | RESPIRATION RATE: 18 BRPM | SYSTOLIC BLOOD PRESSURE: 137 MMHG | DIASTOLIC BLOOD PRESSURE: 62 MMHG | TEMPERATURE: 98.4 F | OXYGEN SATURATION: 98 %

## 2019-04-15 PROCEDURE — 3331090001 HH PPS REVENUE CREDIT

## 2019-04-15 PROCEDURE — G0299 HHS/HOSPICE OF RN EA 15 MIN: HCPCS

## 2019-04-15 PROCEDURE — 3331090002 HH PPS REVENUE DEBIT

## 2019-04-16 ENCOUNTER — HOME CARE VISIT (OUTPATIENT)
Dept: SCHEDULING | Facility: HOME HEALTH | Age: 84
End: 2019-04-16
Payer: MEDICARE

## 2019-04-16 ENCOUNTER — HOME CARE VISIT (OUTPATIENT)
Dept: HOME HEALTH SERVICES | Facility: HOME HEALTH | Age: 84
End: 2019-04-16
Payer: MEDICARE

## 2019-04-16 ENCOUNTER — PATIENT OUTREACH (OUTPATIENT)
Dept: RESPIRATORY THERAPY | Age: 84
End: 2019-04-16

## 2019-04-16 VITALS
HEART RATE: 56 BPM | SYSTOLIC BLOOD PRESSURE: 142 MMHG | RESPIRATION RATE: 18 BRPM | TEMPERATURE: 97.4 F | DIASTOLIC BLOOD PRESSURE: 70 MMHG

## 2019-04-16 PROCEDURE — G0151 HHCP-SERV OF PT,EA 15 MIN: HCPCS

## 2019-04-16 PROCEDURE — 3331090001 HH PPS REVENUE CREDIT

## 2019-04-16 PROCEDURE — 3331090002 HH PPS REVENUE DEBIT

## 2019-04-17 ENCOUNTER — HOME CARE VISIT (OUTPATIENT)
Dept: SCHEDULING | Facility: HOME HEALTH | Age: 84
End: 2019-04-17
Payer: MEDICARE

## 2019-04-17 VITALS
SYSTOLIC BLOOD PRESSURE: 122 MMHG | TEMPERATURE: 97.6 F | OXYGEN SATURATION: 97 % | DIASTOLIC BLOOD PRESSURE: 68 MMHG | RESPIRATION RATE: 18 BRPM | HEART RATE: 60 BPM

## 2019-04-17 VITALS
OXYGEN SATURATION: 98 % | BODY MASS INDEX: 25.97 KG/M2 | SYSTOLIC BLOOD PRESSURE: 142 MMHG | RESPIRATION RATE: 18 BRPM | TEMPERATURE: 97.4 F | DIASTOLIC BLOOD PRESSURE: 70 MMHG | HEART RATE: 56 BPM | WEIGHT: 146.6 LBS

## 2019-04-17 PROCEDURE — G0299 HHS/HOSPICE OF RN EA 15 MIN: HCPCS

## 2019-04-17 PROCEDURE — 3331090001 HH PPS REVENUE CREDIT

## 2019-04-17 PROCEDURE — 3331090002 HH PPS REVENUE DEBIT

## 2019-04-17 NOTE — PROGRESS NOTES
Home Visit # Initial Visit Health  arrived at residence to find the patient alert and oriented in no distress. Patient states that she has done well since her discharge from short term rehab. Education:Reviewed via teach back the CHF action plan to include low sodium diet and fluid restrictions, daily weights, access to Help-line and early interventions. CHF Assessment: 
Shortness of Breath 0 Edema   2 Abdomen  0 Stress   3 Daily activities  2 Sleeping  1 Energy    2 Weight trends  1 
TOTAL= 11 
 
BP:142/70 RA Sitting Weight: 146.6 lb Son bought scale today. Had not weighted prior to [facility] weighing at this visit. Medications: All medicines are on hand and the patient remains compliant. Edema: There is some edema to bilateral lower extremities. PT on scene states maybe slightly more than her previous visit. Patient has not had legs elevated much today and asserts the feet will go down when elevated. The abdomen is soft and non-tender. Lung Sounds:Clear and equal bilaterally. Follow Up Appointments: 
4/30  1130   [facility] Transportation: Family DME: None Safety Concerns: None Patient/Family Concerns: None Tasks: None Physical Assessment completed and noted on CHF assessment Form. Help line discussed. Will follow up with pt in one week via home visit. End of Visit. Parker Salgado Paramedic Health

## 2019-04-18 PROCEDURE — 3331090001 HH PPS REVENUE CREDIT

## 2019-04-18 PROCEDURE — 3331090002 HH PPS REVENUE DEBIT

## 2019-04-19 ENCOUNTER — HOME CARE VISIT (OUTPATIENT)
Dept: SCHEDULING | Facility: HOME HEALTH | Age: 84
End: 2019-04-19
Payer: MEDICARE

## 2019-04-19 PROCEDURE — 3331090001 HH PPS REVENUE CREDIT

## 2019-04-19 PROCEDURE — 3331090002 HH PPS REVENUE DEBIT

## 2019-04-19 PROCEDURE — G0151 HHCP-SERV OF PT,EA 15 MIN: HCPCS

## 2019-04-20 VITALS
HEART RATE: 74 BPM | SYSTOLIC BLOOD PRESSURE: 130 MMHG | RESPIRATION RATE: 18 BRPM | DIASTOLIC BLOOD PRESSURE: 76 MMHG | TEMPERATURE: 97.4 F

## 2019-04-20 PROCEDURE — 3331090002 HH PPS REVENUE DEBIT

## 2019-04-20 PROCEDURE — 3331090001 HH PPS REVENUE CREDIT

## 2019-04-21 PROCEDURE — 3331090002 HH PPS REVENUE DEBIT

## 2019-04-21 PROCEDURE — 3331090001 HH PPS REVENUE CREDIT

## 2019-04-22 ENCOUNTER — HOME CARE VISIT (OUTPATIENT)
Dept: SCHEDULING | Facility: HOME HEALTH | Age: 84
End: 2019-04-22
Payer: MEDICARE

## 2019-04-22 ENCOUNTER — PATIENT OUTREACH (OUTPATIENT)
Dept: RESPIRATORY THERAPY | Age: 84
End: 2019-04-22

## 2019-04-22 VITALS
HEART RATE: 60 BPM | RESPIRATION RATE: 18 BRPM | TEMPERATURE: 97.6 F | SYSTOLIC BLOOD PRESSURE: 142 MMHG | DIASTOLIC BLOOD PRESSURE: 74 MMHG

## 2019-04-22 PROCEDURE — G0151 HHCP-SERV OF PT,EA 15 MIN: HCPCS

## 2019-04-22 PROCEDURE — 3331090001 HH PPS REVENUE CREDIT

## 2019-04-22 PROCEDURE — 3331090002 HH PPS REVENUE DEBIT

## 2019-04-23 ENCOUNTER — HOME CARE VISIT (OUTPATIENT)
Dept: SCHEDULING | Facility: HOME HEALTH | Age: 84
End: 2019-04-23
Payer: MEDICARE

## 2019-04-23 PROCEDURE — 3331090002 HH PPS REVENUE DEBIT

## 2019-04-23 PROCEDURE — 3331090001 HH PPS REVENUE CREDIT

## 2019-04-23 PROCEDURE — G0299 HHS/HOSPICE OF RN EA 15 MIN: HCPCS

## 2019-04-24 PROCEDURE — 3331090001 HH PPS REVENUE CREDIT

## 2019-04-24 PROCEDURE — 3331090002 HH PPS REVENUE DEBIT

## 2019-04-25 ENCOUNTER — HOME CARE VISIT (OUTPATIENT)
Dept: SCHEDULING | Facility: HOME HEALTH | Age: 84
End: 2019-04-25
Payer: MEDICARE

## 2019-04-25 VITALS
HEART RATE: 56 BPM | RESPIRATION RATE: 16 BRPM | TEMPERATURE: 98.1 F | SYSTOLIC BLOOD PRESSURE: 114 MMHG | BODY MASS INDEX: 25.37 KG/M2 | OXYGEN SATURATION: 98 % | DIASTOLIC BLOOD PRESSURE: 60 MMHG | WEIGHT: 143.2 LBS

## 2019-04-25 VITALS
SYSTOLIC BLOOD PRESSURE: 138 MMHG | DIASTOLIC BLOOD PRESSURE: 68 MMHG | RESPIRATION RATE: 18 BRPM | TEMPERATURE: 97.7 F | HEART RATE: 72 BPM

## 2019-04-25 VITALS
TEMPERATURE: 97.3 F | RESPIRATION RATE: 18 BRPM | DIASTOLIC BLOOD PRESSURE: 74 MMHG | HEART RATE: 72 BPM | OXYGEN SATURATION: 98 % | SYSTOLIC BLOOD PRESSURE: 116 MMHG

## 2019-04-25 PROCEDURE — G0151 HHCP-SERV OF PT,EA 15 MIN: HCPCS

## 2019-04-25 PROCEDURE — 3331090002 HH PPS REVENUE DEBIT

## 2019-04-25 PROCEDURE — 3331090001 HH PPS REVENUE CREDIT

## 2019-04-26 ENCOUNTER — HOME CARE VISIT (OUTPATIENT)
Dept: HOME HEALTH SERVICES | Facility: HOME HEALTH | Age: 84
End: 2019-04-26
Payer: MEDICARE

## 2019-04-26 PROCEDURE — 3331090001 HH PPS REVENUE CREDIT

## 2019-04-26 PROCEDURE — 3331090002 HH PPS REVENUE DEBIT

## 2019-04-27 PROCEDURE — 3331090001 HH PPS REVENUE CREDIT

## 2019-04-27 PROCEDURE — 3331090002 HH PPS REVENUE DEBIT

## 2019-04-28 PROCEDURE — 3331090001 HH PPS REVENUE CREDIT

## 2019-04-28 PROCEDURE — 3331090002 HH PPS REVENUE DEBIT

## 2019-04-29 ENCOUNTER — HOME CARE VISIT (OUTPATIENT)
Dept: SCHEDULING | Facility: HOME HEALTH | Age: 84
End: 2019-04-29
Payer: MEDICARE

## 2019-04-29 ENCOUNTER — PATIENT OUTREACH (OUTPATIENT)
Dept: RESPIRATORY THERAPY | Age: 84
End: 2019-04-29

## 2019-04-29 PROCEDURE — 3331090001 HH PPS REVENUE CREDIT

## 2019-04-29 PROCEDURE — G0299 HHS/HOSPICE OF RN EA 15 MIN: HCPCS

## 2019-04-29 PROCEDURE — 3331090002 HH PPS REVENUE DEBIT

## 2019-04-29 NOTE — PROGRESS NOTES
Home Visit # 3 Health  arrived at residence to find the patient alert and oriented per the patients norms, in no acute distress and without complaint. Patient went to [facility] without complication but was exhausted afterward. Encouraged to be active but not over exert herself. Edema remains in lower extremities but does not appear increased. Elevating lower extremities more. To see Cardiology tomorrow. HC will see the patient in home next week. Education:Reviewed via teach back the CHF action plan to include low sodium diet and fluid restrictions, medicine compliance, daily weights and early interventions. CHF Assessment: 
Shortness of Breath 0 Edema   3 Abdomen  0 Stress   4 Daily activities  2 Sleeping  1 Energy    2 Weight trends  1 
TOTAL= 13 
 
BP: 134/68 LA Sitting Weight: 145 lb. Medications: All medicines are on hand and the patient remains compliant. Edema: No notable increases in edema to bilateral lower extremities and the abdomen is soft and non-tender. Lung Sounds:Clear and equal bilaterally. Follow Up Appointments: 
4/30   1130    [facility] Transportation: Family DME: None Safety Concerns: None Patient/Family Concerns: None Tasks: None Physical Assessment completed and noted on CHF assessment Form. Help line discussed. Will follow up with pt in one week via home visit. End of Visit. Ana Becerra Paramedic Health

## 2019-04-30 VITALS
WEIGHT: 145 LBS | HEART RATE: 60 BPM | BODY MASS INDEX: 25.69 KG/M2 | DIASTOLIC BLOOD PRESSURE: 68 MMHG | SYSTOLIC BLOOD PRESSURE: 134 MMHG | RESPIRATION RATE: 18 BRPM | OXYGEN SATURATION: 97 % | TEMPERATURE: 98.3 F

## 2019-04-30 VITALS
TEMPERATURE: 97.8 F | HEART RATE: 78 BPM | RESPIRATION RATE: 16 BRPM | DIASTOLIC BLOOD PRESSURE: 74 MMHG | OXYGEN SATURATION: 96 % | SYSTOLIC BLOOD PRESSURE: 118 MMHG

## 2019-04-30 PROCEDURE — 3331090001 HH PPS REVENUE CREDIT

## 2019-04-30 PROCEDURE — 3331090002 HH PPS REVENUE DEBIT

## 2019-05-01 PROCEDURE — 3331090002 HH PPS REVENUE DEBIT

## 2019-05-01 PROCEDURE — 3331090001 HH PPS REVENUE CREDIT

## 2019-05-02 PROCEDURE — 3331090001 HH PPS REVENUE CREDIT

## 2019-05-02 PROCEDURE — 3331090002 HH PPS REVENUE DEBIT

## 2019-05-03 ENCOUNTER — HOME CARE VISIT (OUTPATIENT)
Dept: SCHEDULING | Facility: HOME HEALTH | Age: 84
End: 2019-05-03
Payer: MEDICARE

## 2019-05-03 PROCEDURE — 3331090001 HH PPS REVENUE CREDIT

## 2019-05-03 PROCEDURE — G0299 HHS/HOSPICE OF RN EA 15 MIN: HCPCS

## 2019-05-03 PROCEDURE — 3331090002 HH PPS REVENUE DEBIT

## 2019-05-04 PROCEDURE — 3331090002 HH PPS REVENUE DEBIT

## 2019-05-04 PROCEDURE — 3331090001 HH PPS REVENUE CREDIT

## 2019-05-05 PROCEDURE — 3331090002 HH PPS REVENUE DEBIT

## 2019-05-05 PROCEDURE — 3331090001 HH PPS REVENUE CREDIT

## 2019-05-06 ENCOUNTER — PATIENT OUTREACH (OUTPATIENT)
Dept: RESPIRATORY THERAPY | Age: 84
End: 2019-05-06

## 2019-05-06 PROCEDURE — 3331090001 HH PPS REVENUE CREDIT

## 2019-05-06 PROCEDURE — 3331090002 HH PPS REVENUE DEBIT

## 2019-05-07 ENCOUNTER — HOME CARE VISIT (OUTPATIENT)
Dept: SCHEDULING | Facility: HOME HEALTH | Age: 84
End: 2019-05-07
Payer: MEDICARE

## 2019-05-07 VITALS
BODY MASS INDEX: 25.9 KG/M2 | SYSTOLIC BLOOD PRESSURE: 142 MMHG | DIASTOLIC BLOOD PRESSURE: 68 MMHG | WEIGHT: 146.2 LBS | HEART RATE: 82 BPM | RESPIRATION RATE: 16 BRPM | TEMPERATURE: 98.2 F | OXYGEN SATURATION: 97 %

## 2019-05-07 PROCEDURE — 3331090001 HH PPS REVENUE CREDIT

## 2019-05-07 PROCEDURE — G0299 HHS/HOSPICE OF RN EA 15 MIN: HCPCS

## 2019-05-07 PROCEDURE — 3331090002 HH PPS REVENUE DEBIT

## 2019-05-07 NOTE — PROGRESS NOTES
Home Visit # 4 Health  arrived at residence to find the patient alert and oriented per the patients norms, in no acute distress and without complaint. Patient states she is doing well. Weight has been up and down a bit. She ate out twice over the weekend and was not as cautions as she should be. Admonished to follow diet as closely as possible to avoid problems. Saw cardiology last week and he was concerned over her bradycardia in the office (48 hr) and placed the patient on 24 hr monitor. Patient has not heard any result yet. To see cardiology in one month. HC will be in home again next week. Education:Reviewed via teach back the CHF action plan to include low sodium diet and fluid restrictions, medicine compliance, daily weights and early interventions. CHF Assessment: 
Shortness of Breath 0 Edema   3 Abdomen  0 Stress   3 Daily activities  2 Sleeping  1 Energy    2 Weight trends  3 
TOTAL= 14 
 
BP: 142/68 LA Sitting Weight: 146.2 lb Medications: All medicines are on hand and the patient remains compliant. Edema: 1+ edema to bilateral lower extremities. Abdomen is soft and non-tender. Lung Sounds:Clear and equal bilaterally. Follow Up Appointments:  
Dr [facility] Cardiology in 4 wks. Patient unsure of exact date. Transportation: Family DME: None Safety Concerns:None Patient/Family Concerns:None Tasks:None Physical Assessment completed and noted on CHF assessment Form. Help line discussed. Will follow up with pt in one week via home visit. End of Visit. Blessing Delatorre, Paramedic Health

## 2019-05-08 PROCEDURE — 3331090001 HH PPS REVENUE CREDIT

## 2019-05-08 PROCEDURE — 3331090002 HH PPS REVENUE DEBIT

## 2019-05-09 PROCEDURE — 3331090001 HH PPS REVENUE CREDIT

## 2019-05-09 PROCEDURE — 3331090002 HH PPS REVENUE DEBIT

## 2019-05-10 PROCEDURE — 3331090002 HH PPS REVENUE DEBIT

## 2019-05-10 PROCEDURE — 3331090001 HH PPS REVENUE CREDIT

## 2019-05-11 PROCEDURE — 3331090002 HH PPS REVENUE DEBIT

## 2019-05-11 PROCEDURE — 3331090001 HH PPS REVENUE CREDIT

## 2019-05-12 PROCEDURE — 3331090002 HH PPS REVENUE DEBIT

## 2019-05-12 PROCEDURE — 3331090001 HH PPS REVENUE CREDIT

## 2019-05-13 ENCOUNTER — PATIENT OUTREACH (OUTPATIENT)
Dept: RESPIRATORY THERAPY | Age: 84
End: 2019-05-13

## 2019-05-13 VITALS
WEIGHT: 143.6 LBS | RESPIRATION RATE: 16 BRPM | TEMPERATURE: 98.2 F | BODY MASS INDEX: 25.44 KG/M2 | DIASTOLIC BLOOD PRESSURE: 66 MMHG | HEART RATE: 61 BPM | SYSTOLIC BLOOD PRESSURE: 122 MMHG | OXYGEN SATURATION: 97 %

## 2019-05-13 PROCEDURE — 3331090001 HH PPS REVENUE CREDIT

## 2019-05-13 PROCEDURE — 3331090002 HH PPS REVENUE DEBIT

## 2019-05-13 NOTE — PROGRESS NOTES
Home Visit #5 Health  arrived at residence to find the patient alert and oriented per the patients norms, in no acute distress and without complaint. Patient denies any SOB, or increases in either LEWIS or Edema. Managing well. Encouraged patient to elevate legs as much as possible and be careful to follow low sodium diet closely to aide in reduction of remaining edema. Will continue with weekly home visits for now. Education: Reviewed via teach back the CHF action plan to include low sodium diet and fluid restrictions, medicine compliance, daily weights and early interventions. CHF Assessment:  
Shortness of Breath 1 Edema   2 Abdomen  0 Stress   3 Daily activities  2 Sleeping  1 Energy    1 Weight trends  1 
TOTAL= 11 
 
BP: 122/66  LA Sitting Weight: 143.6 lb Medications: All medicines are on hand and the patient remains compliant. Edema: Edema remains unchanged with 1+ edema to bilateral lower extremities and the abdomen is soft and non-tender. Lung Sounds:Clear and equal bilaterally. Follow Up Appointments: None Transportation: Family DME: None Safety Concerns: None Patient/Family Concerns: None Tasks: None Physical Assessment completed and noted on CHF assessment Form. Help line discussed. Will follow up with pt in one week via home visit. End of Visit. Presley Pace Paramedic Health

## 2019-05-14 PROCEDURE — 3331090001 HH PPS REVENUE CREDIT

## 2019-05-14 PROCEDURE — 3331090002 HH PPS REVENUE DEBIT

## 2019-05-15 VITALS
HEART RATE: 64 BPM | TEMPERATURE: 97.6 F | RESPIRATION RATE: 18 BRPM | OXYGEN SATURATION: 97 % | DIASTOLIC BLOOD PRESSURE: 72 MMHG | SYSTOLIC BLOOD PRESSURE: 116 MMHG

## 2019-05-15 PROCEDURE — 3331090001 HH PPS REVENUE CREDIT

## 2019-05-15 PROCEDURE — 3331090002 HH PPS REVENUE DEBIT

## 2019-05-16 PROCEDURE — 3331090001 HH PPS REVENUE CREDIT

## 2019-05-16 PROCEDURE — 3331090002 HH PPS REVENUE DEBIT

## 2019-05-17 ENCOUNTER — HOME CARE VISIT (OUTPATIENT)
Dept: SCHEDULING | Facility: HOME HEALTH | Age: 84
End: 2019-05-17
Payer: MEDICARE

## 2019-05-17 PROCEDURE — 3331090002 HH PPS REVENUE DEBIT

## 2019-05-17 PROCEDURE — G0299 HHS/HOSPICE OF RN EA 15 MIN: HCPCS

## 2019-05-17 PROCEDURE — 3331090001 HH PPS REVENUE CREDIT

## 2019-05-18 PROCEDURE — 3331090001 HH PPS REVENUE CREDIT

## 2019-05-18 PROCEDURE — 3331090002 HH PPS REVENUE DEBIT

## 2019-05-19 PROCEDURE — 3331090001 HH PPS REVENUE CREDIT

## 2019-05-19 PROCEDURE — 3331090002 HH PPS REVENUE DEBIT

## 2019-05-20 ENCOUNTER — PATIENT OUTREACH (OUTPATIENT)
Dept: RESPIRATORY THERAPY | Age: 84
End: 2019-05-20

## 2019-05-20 VITALS
SYSTOLIC BLOOD PRESSURE: 118 MMHG | TEMPERATURE: 97.9 F | RESPIRATION RATE: 18 BRPM | HEART RATE: 64 BPM | DIASTOLIC BLOOD PRESSURE: 66 MMHG | OXYGEN SATURATION: 99 %

## 2019-05-20 PROCEDURE — 3331090002 HH PPS REVENUE DEBIT

## 2019-05-20 PROCEDURE — 3331090001 HH PPS REVENUE CREDIT

## 2019-05-21 ENCOUNTER — HOME CARE VISIT (OUTPATIENT)
Dept: SCHEDULING | Facility: HOME HEALTH | Age: 84
End: 2019-05-21
Payer: MEDICARE

## 2019-05-21 VITALS
RESPIRATION RATE: 16 BRPM | WEIGHT: 143.4 LBS | TEMPERATURE: 97.9 F | BODY MASS INDEX: 25.4 KG/M2 | HEART RATE: 77 BPM | OXYGEN SATURATION: 98 %

## 2019-05-21 PROCEDURE — G0299 HHS/HOSPICE OF RN EA 15 MIN: HCPCS

## 2019-05-21 PROCEDURE — 3331090001 HH PPS REVENUE CREDIT

## 2019-05-21 PROCEDURE — 3331090002 HH PPS REVENUE DEBIT

## 2019-05-21 NOTE — PROGRESS NOTES
Home Visit # 6  Health  arrived at residence to find the patient alert and oriented per the patients norms, in no acute distress and without complaint. Edema improved, denies SOB and there is improvement in LEWIS with exercise. Patient reports continued compliance to the CHF action plan. Will visit by phone next week. Education:Reviewed via teach back the CHF action plan to include low sodium diet and fluid restrictions, medicine compliance, daily weights and early interventions. CHF Assessment:  Shortness of Breath 2  Edema   2  Abdomen  0  Stress   3  Daily activities  2  Sleeping  0  Energy    1  Weight trends  0  TOTAL= 10    BP: 140/82 RA Sitting    Weight: 143.4 Lb    Medications: All medicines are on hand and the patient remains compliant. Edema: No notable edema to bilateral lower extremities and the abdomen is soft and non-tender. Lung Sounds:Clear and equal bilaterally. Follow Up Appointments: None this week. Transportation: Family    DME: None    Safety Concerns: None     Patient/Family Concerns: None    Tasks: None    Physical Assessment completed and noted on CHF assessment Form. Help line discussed. Will follow up with pt in one week via home visit. End of Visit.  Larry Napier Paramedic Health

## 2019-05-22 PROCEDURE — 3331090001 HH PPS REVENUE CREDIT

## 2019-05-22 PROCEDURE — 3331090002 HH PPS REVENUE DEBIT

## 2019-05-23 ENCOUNTER — HOME CARE VISIT (OUTPATIENT)
Dept: SCHEDULING | Facility: HOME HEALTH | Age: 84
End: 2019-05-23
Payer: MEDICARE

## 2019-05-23 PROCEDURE — 3331090001 HH PPS REVENUE CREDIT

## 2019-05-23 PROCEDURE — 3331090002 HH PPS REVENUE DEBIT

## 2019-05-23 PROCEDURE — G0299 HHS/HOSPICE OF RN EA 15 MIN: HCPCS

## 2019-05-24 PROCEDURE — 3331090002 HH PPS REVENUE DEBIT

## 2019-05-24 PROCEDURE — 3331090001 HH PPS REVENUE CREDIT

## 2019-05-25 PROCEDURE — 3331090002 HH PPS REVENUE DEBIT

## 2019-05-25 PROCEDURE — 3331090001 HH PPS REVENUE CREDIT

## 2019-05-26 ENCOUNTER — HOME CARE VISIT (OUTPATIENT)
Dept: HOME HEALTH SERVICES | Facility: HOME HEALTH | Age: 84
End: 2019-05-26
Payer: MEDICARE

## 2019-05-26 PROCEDURE — 3331090001 HH PPS REVENUE CREDIT

## 2019-05-26 PROCEDURE — 3331090002 HH PPS REVENUE DEBIT

## 2019-05-26 NOTE — Clinical Note
I would like to keep Man Mancini under service of home health for 60 more days or until you clear her. Her certification period ends this week and I need you consent to do so. I understand that she comes back to you the last part of July. Ms. Darrick Pollard has had several episodes of bradycardia and a small amount of increase weakness, plus her bilaterial lower leg edema. Could you please send me a message that it is ok for home health to continue to see her. Thank You.

## 2019-05-27 PROCEDURE — 3331090001 HH PPS REVENUE CREDIT

## 2019-05-27 PROCEDURE — 3331090002 HH PPS REVENUE DEBIT

## 2019-05-28 ENCOUNTER — PATIENT OUTREACH (OUTPATIENT)
Dept: RESPIRATORY THERAPY | Age: 84
End: 2019-05-28

## 2019-05-28 PROCEDURE — 3331090002 HH PPS REVENUE DEBIT

## 2019-05-28 PROCEDURE — 3331090001 HH PPS REVENUE CREDIT

## 2019-05-28 NOTE — PROGRESS NOTES
Spoke with the patient via phone. Patient states that she is doing well and her weights remain steady at 144 lb. The patient reports compliance with medicines, low sodium diet, and fluid restrictions. Reminded the patient of the CHF action plan and to call for any problems. Will follow in home next week. Patient voiced understanding.

## 2019-05-29 PROCEDURE — 3331090002 HH PPS REVENUE DEBIT

## 2019-05-29 PROCEDURE — 3331090001 HH PPS REVENUE CREDIT

## 2019-05-30 ENCOUNTER — HOME CARE VISIT (OUTPATIENT)
Dept: HOME HEALTH SERVICES | Facility: HOME HEALTH | Age: 84
End: 2019-05-30
Payer: MEDICARE

## 2019-05-30 ENCOUNTER — HOME CARE VISIT (OUTPATIENT)
Dept: SCHEDULING | Facility: HOME HEALTH | Age: 84
End: 2019-05-30
Payer: MEDICARE

## 2019-05-30 PROCEDURE — 3331090002 HH PPS REVENUE DEBIT

## 2019-05-30 PROCEDURE — 3331090001 HH PPS REVENUE CREDIT

## 2019-05-30 PROCEDURE — G0299 HHS/HOSPICE OF RN EA 15 MIN: HCPCS

## 2019-05-31 PROCEDURE — 3331090001 HH PPS REVENUE CREDIT

## 2019-05-31 PROCEDURE — 3331090002 HH PPS REVENUE DEBIT

## 2019-06-01 PROCEDURE — 3331090002 HH PPS REVENUE DEBIT

## 2019-06-01 PROCEDURE — 3331090001 HH PPS REVENUE CREDIT

## 2019-06-02 PROCEDURE — 3331090002 HH PPS REVENUE DEBIT

## 2019-06-02 PROCEDURE — 3331090001 HH PPS REVENUE CREDIT

## 2019-06-03 ENCOUNTER — PATIENT OUTREACH (OUTPATIENT)
Dept: RESPIRATORY THERAPY | Age: 84
End: 2019-06-03

## 2019-06-03 PROCEDURE — 3331090002 HH PPS REVENUE DEBIT

## 2019-06-03 PROCEDURE — 3331090001 HH PPS REVENUE CREDIT

## 2019-06-04 PROCEDURE — 3331090002 HH PPS REVENUE DEBIT

## 2019-06-04 PROCEDURE — 3331090001 HH PPS REVENUE CREDIT

## 2019-06-05 VITALS
TEMPERATURE: 98.1 F | BODY MASS INDEX: 25.51 KG/M2 | OXYGEN SATURATION: 97 % | SYSTOLIC BLOOD PRESSURE: 118 MMHG | HEART RATE: 66 BPM | RESPIRATION RATE: 16 BRPM | WEIGHT: 144 LBS | DIASTOLIC BLOOD PRESSURE: 60 MMHG

## 2019-06-05 PROCEDURE — 3331090002 HH PPS REVENUE DEBIT

## 2019-06-05 PROCEDURE — 3331090001 HH PPS REVENUE CREDIT

## 2019-06-05 NOTE — PROGRESS NOTES
Home Visit #7  Health  arrived at residence to find the patient alert and oriented per the patients norms, in no acute distress and without complaint. HC noted increased edema to lower extremities. Patient denied any SOB at rest and no increase in LEWIS. She states her weights are up 1 lb today and down from previous visit. She reports compliance with fluid restrictions and with diet. She states that she was \"Up and on the go\" most of yesterday and ate lunch at her sons home. Possibly ate something high in sodium. Encouraged the patient to follow the CHF action plan and monitor herself for increased weight, increased edema, shortness of breath or orthopnea. Advised the patient to call ASAP for problems. Patient agreed. HC will visit in home next week. Education: See above Reviewed CHF action plan and early interventions. CHF Assessment:  Shortness of Breath 1  Edema   2  Abdomen  0  Stress   2  Daily activities  2  Sleeping  0   Energy    2  Weight trends  2  TOTAL= 11    BP: 118/60  LA Sitting    Weight: 144 lb    Medications: All medicines are on hand and the patient remains compliant. Edema: Notable edema to bilateral lower extremities 2+ non-pitting with no weeping, and the abdomen is soft and non-tender. Lung Sounds:Clear and equal bilaterally. Follow Up Appointments: None this week    Transportation: Family    DME: None    Safety Concerns: None    Patient/Family Concerns: None    Tasks: None    Physical Assessment completed and noted on CHF assessment Form. Help line discussed. Will follow up with pt in one week via home visit. End of Visit.  Ketty Brandt, Paramedic Health

## 2019-06-06 ENCOUNTER — HOME CARE VISIT (OUTPATIENT)
Dept: SCHEDULING | Facility: HOME HEALTH | Age: 84
End: 2019-06-06
Payer: MEDICARE

## 2019-06-06 PROCEDURE — 3331090001 HH PPS REVENUE CREDIT

## 2019-06-06 PROCEDURE — G0299 HHS/HOSPICE OF RN EA 15 MIN: HCPCS

## 2019-06-06 PROCEDURE — 3331090002 HH PPS REVENUE DEBIT

## 2019-06-07 PROCEDURE — 3331090002 HH PPS REVENUE DEBIT

## 2019-06-07 PROCEDURE — 3331090001 HH PPS REVENUE CREDIT

## 2019-06-08 PROCEDURE — 3331090001 HH PPS REVENUE CREDIT

## 2019-06-08 PROCEDURE — 3331090002 HH PPS REVENUE DEBIT

## 2019-06-09 PROCEDURE — 3331090001 HH PPS REVENUE CREDIT

## 2019-06-09 PROCEDURE — 3331090002 HH PPS REVENUE DEBIT

## 2019-06-10 ENCOUNTER — PATIENT OUTREACH (OUTPATIENT)
Dept: RESPIRATORY THERAPY | Age: 84
End: 2019-06-10

## 2019-06-10 VITALS
HEART RATE: 82 BPM | OXYGEN SATURATION: 98 % | RESPIRATION RATE: 18 BRPM | SYSTOLIC BLOOD PRESSURE: 124 MMHG | DIASTOLIC BLOOD PRESSURE: 76 MMHG | TEMPERATURE: 97.8 F

## 2019-06-10 PROCEDURE — 3331090001 HH PPS REVENUE CREDIT

## 2019-06-10 PROCEDURE — 3331090002 HH PPS REVENUE DEBIT

## 2019-06-11 ENCOUNTER — HOME CARE VISIT (OUTPATIENT)
Dept: SCHEDULING | Facility: HOME HEALTH | Age: 84
End: 2019-06-11
Payer: MEDICARE

## 2019-06-11 VITALS
HEART RATE: 75 BPM | DIASTOLIC BLOOD PRESSURE: 64 MMHG | RESPIRATION RATE: 16 BRPM | BODY MASS INDEX: 25.51 KG/M2 | TEMPERATURE: 98.5 F | OXYGEN SATURATION: 97 % | SYSTOLIC BLOOD PRESSURE: 128 MMHG | WEIGHT: 144 LBS

## 2019-06-11 VITALS
RESPIRATION RATE: 20 BRPM | HEART RATE: 76 BPM | SYSTOLIC BLOOD PRESSURE: 130 MMHG | DIASTOLIC BLOOD PRESSURE: 80 MMHG | OXYGEN SATURATION: 98 %

## 2019-06-11 PROCEDURE — 400014 HH F/U

## 2019-06-11 PROCEDURE — 3331090001 HH PPS REVENUE CREDIT

## 2019-06-11 PROCEDURE — G0299 HHS/HOSPICE OF RN EA 15 MIN: HCPCS

## 2019-06-11 PROCEDURE — 3331090002 HH PPS REVENUE DEBIT

## 2019-06-11 NOTE — PROGRESS NOTES
Home Visit # 8 Final Home Visit  Health  arrived at residence to find the patient alert and oriented per the patients norms, in no acute distress and without complaint. Denies SOB or increased edema and no increase in LEWIS. Has managed herself well. Follows CHF action plan. Encourage her to continue. Will follow via chart review. Education:Reviewed via teach back the CHF action plan to include low sodium diet and fluid restrictions, medicine compliance, daily weights and early interventions. Advised of continued access to help-line after end of visits. CHF Assessment:  Shortness of Breath 1  Edema   2  Abdomen  0  Stress   2  Daily activities  2  Sleeping  0  Energy    2  Weight trends  0  TOTAL= 9    BP: 128/64 LA Sitting    Weight: 144 lb    Medications: All medicines are on hand and the patient remains compliant. Edema: No notable increase in edema to bilateral lower extremities and the abdomen is soft and non-tender. Lung Sounds:Clear and equal bilaterally. Follow Up Appointments: None    Transportation: Family    DME: No Issues    Safety Concerns: None    Patient/Family Concerns: None    Tasks: None    Physical Assessment completed and noted on CHF assessment Form. Help line discussed. End of Visit.  Larry Napier, Paramedic Health

## 2019-06-12 PROCEDURE — 3331090002 HH PPS REVENUE DEBIT

## 2019-06-12 PROCEDURE — 3331090001 HH PPS REVENUE CREDIT

## 2019-06-13 PROCEDURE — 3331090001 HH PPS REVENUE CREDIT

## 2019-06-13 PROCEDURE — 3331090002 HH PPS REVENUE DEBIT

## 2019-06-14 ENCOUNTER — HOME CARE VISIT (OUTPATIENT)
Dept: SCHEDULING | Facility: HOME HEALTH | Age: 84
End: 2019-06-14
Payer: MEDICARE

## 2019-06-14 PROCEDURE — G0299 HHS/HOSPICE OF RN EA 15 MIN: HCPCS

## 2019-06-14 PROCEDURE — 3331090002 HH PPS REVENUE DEBIT

## 2019-06-14 PROCEDURE — 3331090001 HH PPS REVENUE CREDIT

## 2019-06-15 PROCEDURE — 3331090001 HH PPS REVENUE CREDIT

## 2019-06-15 PROCEDURE — 3331090002 HH PPS REVENUE DEBIT

## 2019-06-16 PROCEDURE — 3331090002 HH PPS REVENUE DEBIT

## 2019-06-16 PROCEDURE — 3331090001 HH PPS REVENUE CREDIT

## 2019-06-17 PROCEDURE — 3331090001 HH PPS REVENUE CREDIT

## 2019-06-17 PROCEDURE — 3331090002 HH PPS REVENUE DEBIT

## 2019-06-18 ENCOUNTER — HOME CARE VISIT (OUTPATIENT)
Dept: HOME HEALTH SERVICES | Facility: HOME HEALTH | Age: 84
End: 2019-06-18
Payer: MEDICARE

## 2019-06-18 PROCEDURE — 3331090001 HH PPS REVENUE CREDIT

## 2019-06-18 PROCEDURE — 3331090002 HH PPS REVENUE DEBIT

## 2019-06-18 PROCEDURE — G0299 HHS/HOSPICE OF RN EA 15 MIN: HCPCS

## 2019-06-19 PROCEDURE — 3331090002 HH PPS REVENUE DEBIT

## 2019-06-19 PROCEDURE — 3331090001 HH PPS REVENUE CREDIT

## 2019-06-20 PROCEDURE — 3331090001 HH PPS REVENUE CREDIT

## 2019-06-20 PROCEDURE — 3331090002 HH PPS REVENUE DEBIT

## 2019-06-21 ENCOUNTER — HOME CARE VISIT (OUTPATIENT)
Dept: SCHEDULING | Facility: HOME HEALTH | Age: 84
End: 2019-06-21
Payer: MEDICARE

## 2019-06-21 VITALS
HEART RATE: 76 BPM | TEMPERATURE: 97.6 F | DIASTOLIC BLOOD PRESSURE: 74 MMHG | RESPIRATION RATE: 16 BRPM | BODY MASS INDEX: 25.51 KG/M2 | SYSTOLIC BLOOD PRESSURE: 118 MMHG | WEIGHT: 144 LBS

## 2019-06-21 PROCEDURE — G0299 HHS/HOSPICE OF RN EA 15 MIN: HCPCS

## 2019-06-21 PROCEDURE — 3331090002 HH PPS REVENUE DEBIT

## 2019-06-21 PROCEDURE — 3331090001 HH PPS REVENUE CREDIT

## 2019-06-22 PROCEDURE — 3331090002 HH PPS REVENUE DEBIT

## 2019-06-22 PROCEDURE — 3331090001 HH PPS REVENUE CREDIT

## 2019-06-23 PROCEDURE — 3331090001 HH PPS REVENUE CREDIT

## 2019-06-23 PROCEDURE — 3331090002 HH PPS REVENUE DEBIT

## 2019-06-24 VITALS
TEMPERATURE: 97.2 F | RESPIRATION RATE: 16 BRPM | SYSTOLIC BLOOD PRESSURE: 128 MMHG | OXYGEN SATURATION: 98 % | WEIGHT: 144 LBS | BODY MASS INDEX: 25.51 KG/M2 | DIASTOLIC BLOOD PRESSURE: 60 MMHG | HEART RATE: 68 BPM

## 2019-06-24 PROCEDURE — 3331090001 HH PPS REVENUE CREDIT

## 2019-06-24 PROCEDURE — 3331090002 HH PPS REVENUE DEBIT

## 2019-06-25 VITALS
SYSTOLIC BLOOD PRESSURE: 117 MMHG | OXYGEN SATURATION: 95 % | BODY MASS INDEX: 25.9 KG/M2 | RESPIRATION RATE: 18 BRPM | HEART RATE: 82 BPM | DIASTOLIC BLOOD PRESSURE: 68 MMHG | WEIGHT: 146.2 LBS | TEMPERATURE: 98.3 F

## 2019-06-25 PROCEDURE — 3331090002 HH PPS REVENUE DEBIT

## 2019-06-25 PROCEDURE — 3331090001 HH PPS REVENUE CREDIT

## 2019-06-26 ENCOUNTER — HOME CARE VISIT (OUTPATIENT)
Dept: SCHEDULING | Facility: HOME HEALTH | Age: 84
End: 2019-06-26
Payer: MEDICARE

## 2019-06-26 PROCEDURE — G0299 HHS/HOSPICE OF RN EA 15 MIN: HCPCS

## 2019-06-26 PROCEDURE — 3331090001 HH PPS REVENUE CREDIT

## 2019-06-26 PROCEDURE — 3331090002 HH PPS REVENUE DEBIT

## 2019-06-27 PROCEDURE — 3331090002 HH PPS REVENUE DEBIT

## 2019-06-27 PROCEDURE — 3331090001 HH PPS REVENUE CREDIT

## 2019-06-28 PROCEDURE — 3331090002 HH PPS REVENUE DEBIT

## 2019-06-28 PROCEDURE — 3331090001 HH PPS REVENUE CREDIT

## 2019-06-29 PROCEDURE — 3331090001 HH PPS REVENUE CREDIT

## 2019-06-29 PROCEDURE — 3331090002 HH PPS REVENUE DEBIT

## 2019-06-30 PROCEDURE — 3331090002 HH PPS REVENUE DEBIT

## 2019-06-30 PROCEDURE — 3331090001 HH PPS REVENUE CREDIT

## 2019-07-01 VITALS
BODY MASS INDEX: 25.54 KG/M2 | SYSTOLIC BLOOD PRESSURE: 110 MMHG | DIASTOLIC BLOOD PRESSURE: 68 MMHG | HEART RATE: 76 BPM | TEMPERATURE: 97.6 F | WEIGHT: 144.2 LBS | OXYGEN SATURATION: 97 % | RESPIRATION RATE: 18 BRPM

## 2019-07-01 PROCEDURE — 3331090001 HH PPS REVENUE CREDIT

## 2019-07-01 PROCEDURE — 3331090002 HH PPS REVENUE DEBIT

## 2019-07-02 PROCEDURE — 3331090001 HH PPS REVENUE CREDIT

## 2019-07-02 PROCEDURE — 3331090002 HH PPS REVENUE DEBIT

## 2019-07-03 PROCEDURE — 3331090002 HH PPS REVENUE DEBIT

## 2019-07-03 PROCEDURE — 3331090001 HH PPS REVENUE CREDIT

## 2019-07-04 ENCOUNTER — HOME CARE VISIT (OUTPATIENT)
Dept: HOME HEALTH SERVICES | Facility: HOME HEALTH | Age: 84
End: 2019-07-04
Payer: MEDICARE

## 2019-07-04 PROCEDURE — 3331090001 HH PPS REVENUE CREDIT

## 2019-07-04 PROCEDURE — 3331090002 HH PPS REVENUE DEBIT

## 2019-07-05 PROCEDURE — 3331090001 HH PPS REVENUE CREDIT

## 2019-07-05 PROCEDURE — 3331090002 HH PPS REVENUE DEBIT

## 2019-07-06 PROCEDURE — 3331090001 HH PPS REVENUE CREDIT

## 2019-07-06 PROCEDURE — 3331090002 HH PPS REVENUE DEBIT

## 2019-07-07 PROCEDURE — 3331090002 HH PPS REVENUE DEBIT

## 2019-07-07 PROCEDURE — 3331090001 HH PPS REVENUE CREDIT

## 2019-07-08 PROCEDURE — 3331090001 HH PPS REVENUE CREDIT

## 2019-07-08 PROCEDURE — 3331090002 HH PPS REVENUE DEBIT

## 2019-07-09 PROCEDURE — 3331090002 HH PPS REVENUE DEBIT

## 2019-07-09 PROCEDURE — 3331090001 HH PPS REVENUE CREDIT

## 2019-07-10 ENCOUNTER — HOME CARE VISIT (OUTPATIENT)
Dept: SCHEDULING | Facility: HOME HEALTH | Age: 84
End: 2019-07-10
Payer: MEDICARE

## 2019-07-10 VITALS
RESPIRATION RATE: 16 BRPM | TEMPERATURE: 99.7 F | WEIGHT: 145 LBS | SYSTOLIC BLOOD PRESSURE: 118 MMHG | HEART RATE: 78 BPM | OXYGEN SATURATION: 97 % | DIASTOLIC BLOOD PRESSURE: 62 MMHG | BODY MASS INDEX: 25.69 KG/M2

## 2019-07-10 PROCEDURE — 3331090002 HH PPS REVENUE DEBIT

## 2019-07-10 PROCEDURE — G0299 HHS/HOSPICE OF RN EA 15 MIN: HCPCS

## 2019-07-10 PROCEDURE — 3331090001 HH PPS REVENUE CREDIT

## 2019-07-11 PROCEDURE — 3331090001 HH PPS REVENUE CREDIT

## 2019-07-11 PROCEDURE — 3331090002 HH PPS REVENUE DEBIT

## 2019-07-12 PROCEDURE — 3331090002 HH PPS REVENUE DEBIT

## 2019-07-12 PROCEDURE — 3331090001 HH PPS REVENUE CREDIT

## 2019-07-13 PROCEDURE — 3331090002 HH PPS REVENUE DEBIT

## 2019-07-13 PROCEDURE — 3331090001 HH PPS REVENUE CREDIT

## 2019-07-14 PROCEDURE — 3331090001 HH PPS REVENUE CREDIT

## 2019-07-14 PROCEDURE — 3331090002 HH PPS REVENUE DEBIT

## 2019-07-15 PROCEDURE — 3331090002 HH PPS REVENUE DEBIT

## 2019-07-15 PROCEDURE — 3331090001 HH PPS REVENUE CREDIT

## 2019-07-16 PROCEDURE — 3331090002 HH PPS REVENUE DEBIT

## 2019-07-16 PROCEDURE — 3331090001 HH PPS REVENUE CREDIT

## 2019-07-17 ENCOUNTER — HOME CARE VISIT (OUTPATIENT)
Dept: SCHEDULING | Facility: HOME HEALTH | Age: 84
End: 2019-07-17
Payer: MEDICARE

## 2019-07-17 PROCEDURE — 3331090001 HH PPS REVENUE CREDIT

## 2019-07-17 PROCEDURE — G0299 HHS/HOSPICE OF RN EA 15 MIN: HCPCS

## 2019-07-17 PROCEDURE — 3331090002 HH PPS REVENUE DEBIT

## 2019-07-18 PROCEDURE — 3331090002 HH PPS REVENUE DEBIT

## 2019-07-18 PROCEDURE — 3331090001 HH PPS REVENUE CREDIT

## 2019-07-19 PROCEDURE — 3331090001 HH PPS REVENUE CREDIT

## 2019-07-19 PROCEDURE — 3331090002 HH PPS REVENUE DEBIT

## 2019-07-20 PROCEDURE — 3331090002 HH PPS REVENUE DEBIT

## 2019-07-20 PROCEDURE — 3331090001 HH PPS REVENUE CREDIT

## 2019-07-21 PROCEDURE — 3331090001 HH PPS REVENUE CREDIT

## 2019-07-21 PROCEDURE — 3331090002 HH PPS REVENUE DEBIT

## 2019-07-22 PROCEDURE — 3331090001 HH PPS REVENUE CREDIT

## 2019-07-22 PROCEDURE — 3331090002 HH PPS REVENUE DEBIT

## 2019-07-23 PROCEDURE — 3331090001 HH PPS REVENUE CREDIT

## 2019-07-23 PROCEDURE — 3331090002 HH PPS REVENUE DEBIT

## 2019-07-24 VITALS
TEMPERATURE: 98.2 F | RESPIRATION RATE: 14 BRPM | SYSTOLIC BLOOD PRESSURE: 130 MMHG | OXYGEN SATURATION: 98 % | DIASTOLIC BLOOD PRESSURE: 72 MMHG | HEART RATE: 71 BPM

## 2019-07-24 PROCEDURE — 3331090002 HH PPS REVENUE DEBIT

## 2019-07-24 PROCEDURE — 3331090001 HH PPS REVENUE CREDIT

## 2019-07-25 ENCOUNTER — HOME CARE VISIT (OUTPATIENT)
Dept: SCHEDULING | Facility: HOME HEALTH | Age: 84
End: 2019-07-25
Payer: MEDICARE

## 2019-07-25 VITALS
TEMPERATURE: 98.6 F | SYSTOLIC BLOOD PRESSURE: 134 MMHG | DIASTOLIC BLOOD PRESSURE: 60 MMHG | HEART RATE: 74 BPM | RESPIRATION RATE: 16 BRPM | OXYGEN SATURATION: 95 %

## 2019-07-25 PROCEDURE — 3331090002 HH PPS REVENUE DEBIT

## 2019-07-25 PROCEDURE — 3331090001 HH PPS REVENUE CREDIT

## 2019-07-25 PROCEDURE — G0299 HHS/HOSPICE OF RN EA 15 MIN: HCPCS

## 2019-07-26 PROCEDURE — 3331090001 HH PPS REVENUE CREDIT

## 2019-07-26 PROCEDURE — 3331090002 HH PPS REVENUE DEBIT

## 2019-07-27 PROCEDURE — 3331090002 HH PPS REVENUE DEBIT

## 2019-07-27 PROCEDURE — 3331090001 HH PPS REVENUE CREDIT

## 2019-07-28 PROCEDURE — 3331090002 HH PPS REVENUE DEBIT

## 2019-07-28 PROCEDURE — 3331090001 HH PPS REVENUE CREDIT

## 2019-07-29 PROCEDURE — 3331090002 HH PPS REVENUE DEBIT

## 2019-07-29 PROCEDURE — 3331090001 HH PPS REVENUE CREDIT

## 2019-07-30 PROCEDURE — 3331090001 HH PPS REVENUE CREDIT

## 2019-07-30 PROCEDURE — 3331090002 HH PPS REVENUE DEBIT

## 2019-07-31 PROCEDURE — 3331090002 HH PPS REVENUE DEBIT

## 2019-07-31 PROCEDURE — 3331090001 HH PPS REVENUE CREDIT

## 2019-08-01 ENCOUNTER — HOME CARE VISIT (OUTPATIENT)
Dept: SCHEDULING | Facility: HOME HEALTH | Age: 84
End: 2019-08-01
Payer: MEDICARE

## 2019-08-01 VITALS
DIASTOLIC BLOOD PRESSURE: 60 MMHG | HEART RATE: 76 BPM | RESPIRATION RATE: 16 BRPM | SYSTOLIC BLOOD PRESSURE: 126 MMHG | TEMPERATURE: 98.6 F | OXYGEN SATURATION: 98 %

## 2019-08-01 PROCEDURE — G0299 HHS/HOSPICE OF RN EA 15 MIN: HCPCS

## 2019-08-01 PROCEDURE — 3331090002 HH PPS REVENUE DEBIT

## 2019-08-01 PROCEDURE — 3331090001 HH PPS REVENUE CREDIT

## 2019-08-02 PROCEDURE — 3331090002 HH PPS REVENUE DEBIT

## 2019-08-02 PROCEDURE — 3331090001 HH PPS REVENUE CREDIT

## 2019-08-03 PROCEDURE — 3331090001 HH PPS REVENUE CREDIT

## 2019-08-03 PROCEDURE — 3331090002 HH PPS REVENUE DEBIT

## 2019-08-04 PROCEDURE — 3331090002 HH PPS REVENUE DEBIT

## 2019-08-04 PROCEDURE — 3331090001 HH PPS REVENUE CREDIT

## 2019-08-05 PROCEDURE — 3331090001 HH PPS REVENUE CREDIT

## 2019-08-05 PROCEDURE — 3331090002 HH PPS REVENUE DEBIT

## 2019-08-06 PROCEDURE — 3331090001 HH PPS REVENUE CREDIT

## 2019-08-06 PROCEDURE — 3331090002 HH PPS REVENUE DEBIT

## 2019-08-07 PROCEDURE — 3331090002 HH PPS REVENUE DEBIT

## 2019-08-07 PROCEDURE — 3331090001 HH PPS REVENUE CREDIT

## 2019-08-08 ENCOUNTER — HOME CARE VISIT (OUTPATIENT)
Dept: SCHEDULING | Facility: HOME HEALTH | Age: 84
End: 2019-08-08
Payer: MEDICARE

## 2019-08-12 VITALS
DIASTOLIC BLOOD PRESSURE: 64 MMHG | SYSTOLIC BLOOD PRESSURE: 130 MMHG | HEART RATE: 68 BPM | TEMPERATURE: 97.5 F | OXYGEN SATURATION: 98 % | RESPIRATION RATE: 16 BRPM

## 2020-08-25 PROBLEM — D64.9 ANEMIA: Status: ACTIVE | Noted: 2020-08-25

## 2020-08-25 PROBLEM — R06.02 SHORTNESS OF BREATH: Status: RESOLVED | Noted: 2019-03-08 | Resolved: 2020-08-25

## 2022-03-18 PROBLEM — D64.9 ANEMIA: Status: ACTIVE | Noted: 2020-08-25

## 2022-03-18 PROBLEM — I50.9 CHF (CONGESTIVE HEART FAILURE) (HCC): Status: ACTIVE | Noted: 2019-03-08

## 2022-03-19 PROBLEM — R00.1 BRADYCARDIA: Status: ACTIVE | Noted: 2019-03-08

## 2022-03-19 PROBLEM — I50.30 HEART FAILURE WITH PRESERVED EJECTION FRACTION (HCC): Status: ACTIVE | Noted: 2019-04-03

## 2022-08-23 ENCOUNTER — OFFICE VISIT (OUTPATIENT)
Dept: CARDIOLOGY CLINIC | Age: 87
End: 2022-08-23
Payer: MEDICARE

## 2022-08-23 VITALS
HEART RATE: 104 BPM | BODY MASS INDEX: 25.69 KG/M2 | HEIGHT: 63 IN | SYSTOLIC BLOOD PRESSURE: 110 MMHG | DIASTOLIC BLOOD PRESSURE: 66 MMHG | WEIGHT: 145 LBS

## 2022-08-23 DIAGNOSIS — I48.20 CHRONIC ATRIAL FIBRILLATION (HCC): ICD-10-CM

## 2022-08-23 DIAGNOSIS — I50.32 CHRONIC HEART FAILURE WITH PRESERVED EJECTION FRACTION (HCC): Primary | ICD-10-CM

## 2022-08-23 DIAGNOSIS — Z53.20 MEDICATION REFUSED: ICD-10-CM

## 2022-08-23 DIAGNOSIS — I10 PRIMARY HYPERTENSION: ICD-10-CM

## 2022-08-23 PROCEDURE — 99214 OFFICE O/P EST MOD 30 MIN: CPT | Performed by: INTERNAL MEDICINE

## 2022-08-23 PROCEDURE — 1123F ACP DISCUSS/DSCN MKR DOCD: CPT | Performed by: INTERNAL MEDICINE

## 2022-08-23 PROCEDURE — 93000 ELECTROCARDIOGRAM COMPLETE: CPT | Performed by: INTERNAL MEDICINE

## 2022-08-23 RX ORDER — FUROSEMIDE 20 MG/1
20 TABLET ORAL DAILY
Qty: 60 TABLET | Refills: 3 | Status: SHIPPED | OUTPATIENT
Start: 2022-08-23

## 2022-08-23 RX ORDER — AMLODIPINE BESYLATE 5 MG/1
5 TABLET ORAL DAILY
Qty: 90 TABLET | Refills: 3 | Status: SHIPPED | OUTPATIENT
Start: 2022-08-23

## 2022-08-23 RX ORDER — ATORVASTATIN CALCIUM 40 MG/1
40 TABLET, FILM COATED ORAL DAILY
Qty: 90 TABLET | Refills: 3 | Status: SHIPPED | OUTPATIENT
Start: 2022-08-23

## 2022-08-23 RX ORDER — FUROSEMIDE 40 MG/1
40 TABLET ORAL DAILY
Qty: 90 TABLET | Refills: 3 | Status: CANCELLED | OUTPATIENT
Start: 2022-08-23

## 2022-08-23 NOTE — PROGRESS NOTES
Effort: Pulmonary effort is normal.      Breath sounds: Normal breath sounds. Abdominal:      General: Abdomen is flat. Palpations: Abdomen is soft. There is no mass. Musculoskeletal:         General: Normal range of motion. Cervical back: Normal range of motion. Skin:     General: Skin is warm and dry. Neurological:      General: No focal deficit present. Mental Status: She is alert and oriented to person, place, and time. Psychiatric:         Mood and Affect: Mood normal.         RECENT LABS AND RECORDS REVIEW    none      ASSESSMENT and PLAN    Blane Cano was seen today for congestive heart failure. Diagnoses and all orders for this visit:    Chronic heart failure with preserved ejection fraction (HCC)  -     EKG 12 Lead    Chronic atrial fibrillation ([address]    Primary hypertension    Medication refused: [address]    Other orders  -     amLODIPine (NORVASC) 5 MG tablet; Take 1 tablet by mouth daily  -     atorvastatin (LIPITOR) 40 MG tablet; Take 1 tablet by mouth daily  -     furosemide (LASIX) 20 MG tablet; Take 1 tablet by mouth daily     Decrease Lasix to 20 and observe    Return in about 6 months (around 2/23/2023).        Arianna Abebe MD  8/23/2022  9:13 AM

## 2023-02-07 ENCOUNTER — OFFICE VISIT (OUTPATIENT)
Dept: CARDIOLOGY CLINIC | Age: 88
End: 2023-02-07
Payer: MEDICARE

## 2023-02-07 VITALS
HEIGHT: 63 IN | BODY MASS INDEX: 24.45 KG/M2 | DIASTOLIC BLOOD PRESSURE: 72 MMHG | HEART RATE: 88 BPM | SYSTOLIC BLOOD PRESSURE: 168 MMHG | WEIGHT: 138 LBS

## 2023-02-07 DIAGNOSIS — I10 PRIMARY HYPERTENSION: ICD-10-CM

## 2023-02-07 DIAGNOSIS — I48.20 CHRONIC ATRIAL FIBRILLATION (HCC): ICD-10-CM

## 2023-02-07 DIAGNOSIS — I50.32 CHRONIC HEART FAILURE WITH PRESERVED EJECTION FRACTION (HCC): Primary | ICD-10-CM

## 2023-02-07 PROCEDURE — G8428 CUR MEDS NOT DOCUMENT: HCPCS | Performed by: INTERNAL MEDICINE

## 2023-02-07 PROCEDURE — G8484 FLU IMMUNIZE NO ADMIN: HCPCS | Performed by: INTERNAL MEDICINE

## 2023-02-07 PROCEDURE — 1090F PRES/ABSN URINE INCON ASSESS: CPT | Performed by: INTERNAL MEDICINE

## 2023-02-07 PROCEDURE — 4004F PT TOBACCO SCREEN RCVD TLK: CPT | Performed by: INTERNAL MEDICINE

## 2023-02-07 PROCEDURE — 1123F ACP DISCUSS/DSCN MKR DOCD: CPT | Performed by: INTERNAL MEDICINE

## 2023-02-07 PROCEDURE — 99214 OFFICE O/P EST MOD 30 MIN: CPT | Performed by: INTERNAL MEDICINE

## 2023-02-07 PROCEDURE — G8420 CALC BMI NORM PARAMETERS: HCPCS | Performed by: INTERNAL MEDICINE

## 2023-02-07 RX ORDER — INDAPAMIDE 1.25 MG/1
1.25 TABLET, FILM COATED ORAL EVERY MORNING
Qty: 30 TABLET | Refills: 3 | Status: SHIPPED | OUTPATIENT
Start: 2023-02-07

## 2023-02-07 RX ORDER — ATORVASTATIN CALCIUM 40 MG/1
40 TABLET, FILM COATED ORAL DAILY
Qty: 90 TABLET | Refills: 3 | Status: SHIPPED | OUTPATIENT
Start: 2023-02-07

## 2023-02-07 NOTE — PROGRESS NOTES
[facility] CARDIOLOGY  [address], [address] [facility], [address] [place] [address]  PHONE: [phone]        23        NAME:  Haydee Galvez  : 10/23/1926  MRN: [id number]     CHIEF COMPLAINT:    Congestive Heart Failure      SUBJECTIVE:     No chest pain or palpitation or dizziness. Freq. wet from urine. Medications were all reviewed with the patient today and updated as necessary. Current Outpatient Medications   Medication Sig    atorvastatin (LIPITOR) 40 MG tablet Take 1 tablet by mouth daily    indapamide (LOZOL) 1.25 MG tablet Take 1 tablet by mouth every morning    acetaminophen (TYLENOL) 325 MG tablet Take 650 mg by mouth every 6 hours as needed    aspirin 81 MG EC tablet Take 81 mg by mouth daily    brimonidine (ALPHAGAN) 0.2 % ophthalmic solution Apply 1 drop to eye 3 times daily    cyanocobalamin 1000 MCG tablet Take 1,000 mcg by mouth daily    dorzolamide-timolol 22.3-6.8 MG/ML SOLN Apply 1 drop to eye 2 times daily    ferrous sulfate (IRON 325) 325 (65 Fe) MG tablet One pill every other day    latanoprost (XALATAN) 0.005 % ophthalmic solution Apply 1 drop to eye     No current facility-administered medications for this visit. No Known Allergies        PHYSICAL EXAM:     Wt Readings from Last 3 Encounters:   23 138 lb (62.6 kg)   22 145 lb (65.8 kg)   22 139 lb 6.4 oz (63.2 kg)     BP Readings from Last 3 Encounters:   22 110/66   22 (!) 140/80   21 (!) 140/80       Ht 5' 3\" (1.6 m)   Wt 138 lb (62.6 kg)   BMI 24.45 kg/m²     Physical Exam  Vitals reviewed. HENT:      Head: Normocephalic and atraumatic. Eyes:      Extraocular Movements: Extraocular movements intact. Pupils: Pupils are equal, round, and reactive to light. Cardiovascular:      Rate and Rhythm: Normal rate. Heart sounds: Normal heart sounds. Pulmonary:      Effort: Pulmonary effort is normal.      Breath sounds: Normal breath sounds.    Abdominal: General: Abdomen is flat. Palpations: Abdomen is soft. There is no mass. Musculoskeletal:         General: Swelling present. Normal range of motion. Cervical back: Normal range of motion. Skin:     General: Skin is warm and dry. Neurological:      General: No focal deficit present. Mental Status: She is alert and oriented to person, place, and time. Psychiatric:         Mood and Affect: Mood normal.         RECENT LABS AND RECORDS REVIEW    Creat 0.89      ASSESSMENT and PLAN  Chronic heart failure with preserved ejection fraction (HCC)  Chronic atrial fibrillation (HCC)  Primary hypertension  Medication refused: [address] [place] [address] Lily Mares was seen today for congestive heart failure. Diagnoses and all orders for this visit:    Chronic heart failure with preserved ejection fraction (HCC)  -     indapamide (LOZOL) 1.25 MG tablet; Take 1 tablet by mouth every morning    Chronic atrial fibrillation (HCC)    Primary hypertension  -     indapamide (LOZOL) 1.25 MG tablet; Take 1 tablet by mouth every morning    Other orders  -     atorvastatin (LIPITOR) 40 MG tablet; Take 1 tablet by mouth daily     Return in about 6 weeks (around 3/21/2023).        Darrell Pacheco MD  2/7/2023  8:20 AM

## 2023-03-24 ENCOUNTER — OFFICE VISIT (OUTPATIENT)
Dept: CARDIOLOGY CLINIC | Age: 88
End: 2023-03-24
Payer: MEDICARE

## 2023-03-24 VITALS
HEIGHT: 63 IN | SYSTOLIC BLOOD PRESSURE: 193 MMHG | HEART RATE: 82 BPM | WEIGHT: 133 LBS | DIASTOLIC BLOOD PRESSURE: 89 MMHG | BODY MASS INDEX: 23.57 KG/M2

## 2023-03-24 DIAGNOSIS — I10 PRIMARY HYPERTENSION: ICD-10-CM

## 2023-03-24 DIAGNOSIS — I48.20 CHRONIC ATRIAL FIBRILLATION (HCC): ICD-10-CM

## 2023-03-24 DIAGNOSIS — I50.32 CHRONIC HEART FAILURE WITH PRESERVED EJECTION FRACTION (HCC): Primary | ICD-10-CM

## 2023-03-24 PROCEDURE — 99214 OFFICE O/P EST MOD 30 MIN: CPT | Performed by: INTERNAL MEDICINE

## 2023-03-24 PROCEDURE — 1123F ACP DISCUSS/DSCN MKR DOCD: CPT | Performed by: INTERNAL MEDICINE

## 2023-03-24 PROCEDURE — G8428 CUR MEDS NOT DOCUMENT: HCPCS | Performed by: INTERNAL MEDICINE

## 2023-03-24 PROCEDURE — 1090F PRES/ABSN URINE INCON ASSESS: CPT | Performed by: INTERNAL MEDICINE

## 2023-03-24 PROCEDURE — G8484 FLU IMMUNIZE NO ADMIN: HCPCS | Performed by: INTERNAL MEDICINE

## 2023-03-24 PROCEDURE — G8420 CALC BMI NORM PARAMETERS: HCPCS | Performed by: INTERNAL MEDICINE

## 2023-03-24 PROCEDURE — 4004F PT TOBACCO SCREEN RCVD TLK: CPT | Performed by: INTERNAL MEDICINE

## 2023-03-24 RX ORDER — AMLODIPINE BESYLATE AND BENAZEPRIL HYDROCHLORIDE 5; 10 MG/1; MG/1
1 CAPSULE ORAL DAILY
Qty: 30 CAPSULE | Refills: 3 | Status: SHIPPED | OUTPATIENT
Start: 2023-03-24 | End: 2023-03-24 | Stop reason: CLARIF

## 2023-03-24 RX ORDER — LOSARTAN POTASSIUM 100 MG/1
100 TABLET ORAL DAILY
Qty: 30 TABLET | Refills: 1 | Status: SHIPPED | OUTPATIENT
Start: 2023-03-24

## 2023-03-24 NOTE — PROGRESS NOTES
[address] [address] [address], [address] [facility], [address] [place] [address]  PHONE: [phone]        23        NAME:  Madhuri Ojeda  : 10/23/1926  MRN: [id number]     CHIEF COMPLAINT:    Congestive Heart Failure     Chronic heart failure with preserved ejection fraction (HCC)  Chronic atrial fibrillation ([address]  Primary hypertension  Medication refused: [address]    SUBJECTIVE:     No chest pain or palpitation or dizziness. Medications were all reviewed with the patient today and updated as necessary. Current Outpatient Medications   Medication Sig    atorvastatin (LIPITOR) 40 MG tablet Take 1 tablet by mouth daily    indapamide (LOZOL) 1.25 MG tablet Take 1 tablet by mouth every morning    acetaminophen (TYLENOL) 325 MG tablet Take 650 mg by mouth every 6 hours as needed    aspirin 81 MG EC tablet Take 81 mg by mouth daily    brimonidine (ALPHAGAN) 0.2 % ophthalmic solution Apply 1 drop to eye 3 times daily    cyanocobalamin 1000 MCG tablet Take 1,000 mcg by mouth daily    dorzolamide-timolol 22.3-6.8 MG/ML SOLN Apply 1 drop to eye 2 times daily    ferrous sulfate (IRON 325) 325 (65 Fe) MG tablet One pill every other day    latanoprost (XALATAN) 0.005 % ophthalmic solution Apply 1 drop to eye    losartan (COZAAR) 100 MG tablet Take 1 tablet by mouth daily     No current facility-administered medications for this visit. No Known Allergies        PHYSICAL EXAM:     Wt Readings from Last 3 Encounters:   23 133 lb (60.3 kg)   23 138 lb (62.6 kg)   22 145 lb (65.8 kg)     BP Readings from Last 3 Encounters:   23 (!) 193/89   23 (!) 168/72   22 110/66       BP (!) 193/89   Pulse 82   Ht 5' 3\" (1.6 m)   Wt 133 lb (60.3 kg)   BMI 23.56 kg/m²     Physical Exam  Vitals reviewed. HENT:      Head: Normocephalic and atraumatic. Eyes:      Extraocular Movements: Extraocular movements intact. Pupils: Pupils are equal, round, and reactive to light.

## 2023-05-04 ENCOUNTER — OFFICE VISIT (OUTPATIENT)
Dept: CARDIOLOGY CLINIC | Age: 88
End: 2023-05-04
Payer: MEDICARE

## 2023-05-04 VITALS
SYSTOLIC BLOOD PRESSURE: 116 MMHG | DIASTOLIC BLOOD PRESSURE: 68 MMHG | BODY MASS INDEX: 23.74 KG/M2 | HEART RATE: 81 BPM | HEIGHT: 63 IN | WEIGHT: 134 LBS

## 2023-05-04 DIAGNOSIS — I50.32 CHRONIC HEART FAILURE WITH PRESERVED EJECTION FRACTION (HCC): Primary | ICD-10-CM

## 2023-05-04 DIAGNOSIS — I10 PRIMARY HYPERTENSION: ICD-10-CM

## 2023-05-04 DIAGNOSIS — I48.20 CHRONIC ATRIAL FIBRILLATION (HCC): ICD-10-CM

## 2023-05-04 PROCEDURE — 99214 OFFICE O/P EST MOD 30 MIN: CPT | Performed by: INTERNAL MEDICINE

## 2023-05-04 PROCEDURE — G8428 CUR MEDS NOT DOCUMENT: HCPCS | Performed by: INTERNAL MEDICINE

## 2023-05-04 PROCEDURE — 1123F ACP DISCUSS/DSCN MKR DOCD: CPT | Performed by: INTERNAL MEDICINE

## 2023-05-04 PROCEDURE — 4004F PT TOBACCO SCREEN RCVD TLK: CPT | Performed by: INTERNAL MEDICINE

## 2023-05-04 PROCEDURE — 1090F PRES/ABSN URINE INCON ASSESS: CPT | Performed by: INTERNAL MEDICINE

## 2023-05-04 PROCEDURE — G8420 CALC BMI NORM PARAMETERS: HCPCS | Performed by: INTERNAL MEDICINE

## 2023-05-04 RX ORDER — LOSARTAN POTASSIUM 100 MG/1
100 TABLET ORAL DAILY
Qty: 90 TABLET | Refills: 3 | Status: SHIPPED | OUTPATIENT
Start: 2023-05-04

## 2023-05-04 NOTE — PROGRESS NOTES
Effort: Pulmonary effort is normal.      Breath sounds: Normal breath sounds. Abdominal:      General: Abdomen is flat. Palpations: Abdomen is soft. There is no mass. Musculoskeletal:         General: Normal range of motion. Cervical back: Normal range of motion. Skin:     General: Skin is warm and dry. Neurological:      General: No focal deficit present. Mental Status: She is alert and oriented to person, place, and time. Psychiatric:         Mood and Affect: Mood normal.         RECENT LABS AND RECORDS REVIEW    Creat 0.98      ASSESSMENT and Lydia Roth was seen today for congestive heart failure. Diagnoses and all orders for this visit:    Chronic heart failure with preserved ejection fraction ([facility] [address] [address]  Doing well. Continue current rx. Chronic atrial fibrillation (HCC)  Doing well. Continue current rx. Primary hypertension  Doing well. Continue current rx. Other orders  -     losartan (COZAAR) 100 MG tablet; Take 1 tablet by mouth daily       Return in about 6 months (around 11/4/2023).        Antonio Lerma MD  5/4/2023  10:49 AM

## 2023-11-06 ENCOUNTER — OFFICE VISIT (OUTPATIENT)
Age: 88
End: 2023-11-06
Payer: MEDICARE

## 2023-11-06 VITALS — SYSTOLIC BLOOD PRESSURE: 170 MMHG | HEART RATE: 72 BPM | DIASTOLIC BLOOD PRESSURE: 88 MMHG

## 2023-11-06 DIAGNOSIS — I48.20 CHRONIC ATRIAL FIBRILLATION (HCC): ICD-10-CM

## 2023-11-06 DIAGNOSIS — I10 PRIMARY HYPERTENSION: ICD-10-CM

## 2023-11-06 DIAGNOSIS — I50.32 CHRONIC HEART FAILURE WITH PRESERVED EJECTION FRACTION (HCC): Primary | ICD-10-CM

## 2023-11-06 PROCEDURE — G8420 CALC BMI NORM PARAMETERS: HCPCS | Performed by: INTERNAL MEDICINE

## 2023-11-06 PROCEDURE — G8428 CUR MEDS NOT DOCUMENT: HCPCS | Performed by: INTERNAL MEDICINE

## 2023-11-06 PROCEDURE — 99214 OFFICE O/P EST MOD 30 MIN: CPT | Performed by: INTERNAL MEDICINE

## 2023-11-06 PROCEDURE — 4004F PT TOBACCO SCREEN RCVD TLK: CPT | Performed by: INTERNAL MEDICINE

## 2023-11-06 PROCEDURE — 1123F ACP DISCUSS/DSCN MKR DOCD: CPT | Performed by: INTERNAL MEDICINE

## 2023-11-06 PROCEDURE — 1090F PRES/ABSN URINE INCON ASSESS: CPT | Performed by: INTERNAL MEDICINE

## 2023-11-06 PROCEDURE — G8484 FLU IMMUNIZE NO ADMIN: HCPCS | Performed by: INTERNAL MEDICINE

## 2023-11-06 NOTE — PROGRESS NOTES
normal.      Breath sounds: Normal breath sounds. Abdominal:      General: Abdomen is flat. Palpations: Abdomen is soft. There is no mass. Musculoskeletal:         General: Normal range of motion. Cervical back: Normal range of motion. Skin:     General: Skin is warm and dry. Neurological:      General: No focal deficit present. Mental Status: She is alert and oriented to person, place, and time. Psychiatric:         Mood and Affect: Mood normal.           RECENT LABS AND RECORDS REVIEW    Creat 0.96      ASSESSMENT and Michael Wolfe was seen today for congestive heart failure. Diagnoses and all orders for this visit:    Chronic heart failure with preserved ejection fraction ([address])  Doing well. Continue current rx. Chronic atrial fibrillation (HCC)  Doing well. Continue current rx. Primary hypertension  Doing well. Continue current rx. Monitor BP at home and anticipate an inc in BP rx if BP consistently> 160 mmHg. I would use amlodipine 2.5     Return in about 6 months (around 5/6/2024).        Hue Ramos MD  11/6/2023  10:17 AM

## 2024-05-13 ENCOUNTER — OFFICE VISIT (OUTPATIENT)
Age: 89
End: 2024-05-13
Payer: MEDICARE

## 2024-05-13 VITALS
HEART RATE: 98 BPM | SYSTOLIC BLOOD PRESSURE: 212 MMHG | BODY MASS INDEX: 23.21 KG/M2 | HEIGHT: 63 IN | WEIGHT: 131 LBS | DIASTOLIC BLOOD PRESSURE: 72 MMHG

## 2024-05-13 DIAGNOSIS — I48.20 CHRONIC ATRIAL FIBRILLATION (HCC): ICD-10-CM

## 2024-05-13 DIAGNOSIS — I50.32 CHRONIC HEART FAILURE WITH PRESERVED EJECTION FRACTION (HCC): Primary | ICD-10-CM

## 2024-05-13 PROCEDURE — 1090F PRES/ABSN URINE INCON ASSESS: CPT | Performed by: INTERNAL MEDICINE

## 2024-05-13 PROCEDURE — G8428 CUR MEDS NOT DOCUMENT: HCPCS | Performed by: INTERNAL MEDICINE

## 2024-05-13 PROCEDURE — 1123F ACP DISCUSS/DSCN MKR DOCD: CPT | Performed by: INTERNAL MEDICINE

## 2024-05-13 PROCEDURE — 99214 OFFICE O/P EST MOD 30 MIN: CPT | Performed by: INTERNAL MEDICINE

## 2024-05-13 PROCEDURE — G8420 CALC BMI NORM PARAMETERS: HCPCS | Performed by: INTERNAL MEDICINE

## 2024-05-13 PROCEDURE — 4004F PT TOBACCO SCREEN RCVD TLK: CPT | Performed by: INTERNAL MEDICINE

## 2024-05-13 RX ORDER — AMLODIPINE BESYLATE 2.5 MG/1
2.5 TABLET ORAL DAILY
COMMUNITY

## 2024-05-13 NOTE — PROGRESS NOTES
irregular.      Heart sounds: Normal heart sounds.   Pulmonary:      Effort: Pulmonary effort is normal.      Breath sounds: Normal breath sounds.   Abdominal:      General: Abdomen is flat.      Palpations: Abdomen is soft. There is no mass.   Musculoskeletal:         General: Normal range of motion.      Cervical back: Normal range of motion.   Skin:     General: Skin is warm and dry.   Neurological:      General: No focal deficit present.      Mental Status: She is alert and oriented to person, place, and time.   Psychiatric:         Mood and Affect: Mood normal.           RECENT LABS AND RECORDS REVIEW    Creat 0.96      ASSESSMENT and PLAN    Chasity was seen today for congestive heart failure.    Diagnoses and all orders for this visit:    Chronic heart failure with preserved ejection fraction (HCC)  Doing well. Continue current rx.  Chronic atrial fibrillation (HCC)  Doing well. Continue current rx.  Primary hypertension  Doing well. Continue current rx.       Return in about 6 months (around 11/13/2024).       HARLEY ALVAREZ MD  5/13/2024  10:01 AM

## 2024-05-14 ENCOUNTER — TELEPHONE (OUTPATIENT)
Age: 89
End: 2024-05-14

## 2024-05-14 RX ORDER — LOSARTAN POTASSIUM 100 MG/1
100 TABLET ORAL DAILY
Qty: 90 TABLET | Refills: 0 | Status: SHIPPED | OUTPATIENT
Start: 2024-05-14

## 2024-05-14 NOTE — TELEPHONE ENCOUNTER
MEDICATION REFILL REQUEST      Name of Medication:  Losartan  Dose:  100 mg  Frequency:  QD  Quantity:  90  Days' supply:  90 with 3 refills      Pharmacy Name/Location:  [facility]-[phone]

## 2024-08-16 RX ORDER — LOSARTAN POTASSIUM 100 MG/1
100 TABLET ORAL DAILY
Qty: 90 TABLET | Refills: 0 | Status: SHIPPED | OUTPATIENT
Start: 2024-08-16

## 2024-11-06 ENCOUNTER — OFFICE VISIT (OUTPATIENT)
Age: 89
End: 2024-11-06

## 2024-11-06 VITALS
DIASTOLIC BLOOD PRESSURE: 64 MMHG | SYSTOLIC BLOOD PRESSURE: 102 MMHG | HEART RATE: 104 BPM | WEIGHT: 134 LBS | HEIGHT: 65 IN | BODY MASS INDEX: 22.33 KG/M2

## 2024-11-06 DIAGNOSIS — I10 PRIMARY HYPERTENSION: ICD-10-CM

## 2024-11-06 DIAGNOSIS — I48.20 CHRONIC ATRIAL FIBRILLATION (HCC): Primary | ICD-10-CM

## 2024-11-06 RX ORDER — LOSARTAN POTASSIUM 100 MG/1
100 TABLET ORAL DAILY
Qty: 90 TABLET | Refills: 3 | Status: SHIPPED | OUTPATIENT
Start: 2024-11-06

## 2024-11-06 RX ORDER — ATORVASTATIN CALCIUM 40 MG/1
40 TABLET, FILM COATED ORAL DAILY
Qty: 90 TABLET | Refills: 3 | Status: SHIPPED | OUTPATIENT
Start: 2024-11-06

## 2024-11-06 NOTE — PROGRESS NOTES
Rhythm: Normal rate. Rhythm irregular.      Heart sounds: Normal heart sounds.   Pulmonary:      Effort: Pulmonary effort is normal.      Breath sounds: Normal breath sounds.   Abdominal:      General: Abdomen is flat.      Palpations: Abdomen is soft. There is no mass.   Musculoskeletal:         General: Normal range of motion.      Cervical back: Normal range of motion.   Skin:     General: Skin is warm and dry.   Neurological:      General: No focal deficit present.      Mental Status: She is alert and oriented to person, place, and time.   Psychiatric:         Mood and Affect: Mood normal.           RECENT LABS AND RECORDS REVIEW    Creat 0.93, t chol 149    ASSESSMENT and PLAN    Chasiyt was seen today for congestive heart failure.    Diagnoses and all orders for this visit:    Chronic heart failure with preserved ejection fraction (HCC)  Doing well. Continue current rx.  Chronic atrial fibrillation (HCC)  Doing well. Continue current rx.  Primary hypertension  Doing well. Continue current rx.       Return in about 6 months (around 5/6/2025).       HARLEY ALVAREZ MD  11/6/2024  10:48 AM

## 2025-01-30 ENCOUNTER — APPOINTMENT (OUTPATIENT)
Dept: GENERAL RADIOLOGY | Age: 89
DRG: 951 | End: 2025-01-30
Payer: MEDICARE

## 2025-01-30 ENCOUNTER — HOSPITAL ENCOUNTER (INPATIENT)
Age: 89
LOS: 1 days | Discharge: HOME | DRG: 951 | End: 2025-01-30
Attending: GENERAL PRACTICE | Admitting: INTERNAL MEDICINE
Payer: MEDICARE

## 2025-01-30 VITALS
OXYGEN SATURATION: 6 % | BODY MASS INDEX: 24.59 KG/M2 | WEIGHT: 153 LBS | RESPIRATION RATE: 6 BRPM | SYSTOLIC BLOOD PRESSURE: 61 MMHG | HEIGHT: 66 IN | DIASTOLIC BLOOD PRESSURE: 36 MMHG | TEMPERATURE: 96 F

## 2025-01-30 DIAGNOSIS — I46.9 CARDIOPULMONARY ARREST: Primary | ICD-10-CM

## 2025-01-30 PROBLEM — I48.91 A-FIB (HCC): Status: ACTIVE | Noted: 2025-01-30

## 2025-01-30 PROBLEM — I10 HTN (HYPERTENSION): Status: ACTIVE | Noted: 2025-01-30

## 2025-01-30 PROBLEM — I50.32 CHRONIC DIASTOLIC CONGESTIVE HEART FAILURE (HCC): Status: ACTIVE | Noted: 2025-01-30

## 2025-01-30 PROBLEM — Z51.5 PALLIATIVE CARE ENCOUNTER: Status: ACTIVE | Noted: 2025-01-30

## 2025-01-30 PROBLEM — Z66 DNR (DO NOT RESUSCITATE): Status: ACTIVE | Noted: 2025-01-30

## 2025-01-30 PROBLEM — E78.5 HLD (HYPERLIPIDEMIA): Status: ACTIVE | Noted: 2025-01-30

## 2025-01-30 LAB
ALBUMIN SERPL-MCNC: 3 G/DL (ref 3.2–4.6)
ALBUMIN/GLOB SERPL: 0.8 (ref 1–1.9)
ALP SERPL-CCNC: 100 U/L (ref 35–104)
ALT SERPL-CCNC: 40 U/L (ref 8–45)
ANION GAP SERPL CALC-SCNC: 22 MMOL/L (ref 7–16)
ARTERIAL PATENCY WRIST A: POSITIVE
AST SERPL-CCNC: 67 U/L (ref 15–37)
BASE DEFICIT BLD-SCNC: 13.2 MMOL/L
BASOPHILS # BLD: 0.1 K/UL (ref 0–0.2)
BASOPHILS NFR BLD: 0.9 % (ref 0–2)
BDY SITE: ABNORMAL
BILIRUB SERPL-MCNC: 0.3 MG/DL (ref 0–1.2)
BUN SERPL-MCNC: 17 MG/DL (ref 8–23)
CALCIUM SERPL-MCNC: 8.9 MG/DL (ref 8.8–10.2)
CHLORIDE SERPL-SCNC: 102 MMOL/L (ref 98–107)
CO2 SERPL-SCNC: 18 MMOL/L (ref 20–29)
CREAT SERPL-MCNC: 1.26 MG/DL (ref 0.6–1.1)
DIFFERENTIAL METHOD BLD: ABNORMAL
EKG ATRIAL RATE: 59 BPM
EKG DIAGNOSIS: NORMAL
EKG Q-T INTERVAL: 396 MS
EKG QRS DURATION: 132 MS
EKG QTC CALCULATION (BAZETT): 551 MS
EKG R AXIS: -65 DEGREES
EKG T AXIS: 88 DEGREES
EKG VENTRICULAR RATE: 116 BPM
EOSINOPHIL # BLD: 0.19 K/UL (ref 0–0.8)
EOSINOPHIL NFR BLD: 1.8 % (ref 0.5–7.8)
ERYTHROCYTE [DISTWIDTH] IN BLOOD BY AUTOMATED COUNT: 14.3 % (ref 11.9–14.6)
FLUAV RNA SPEC QL NAA+PROBE: NOT DETECTED
FLUBV RNA SPEC QL NAA+PROBE: NOT DETECTED
GAS FLOW.O2 O2 DELIVERY SYS: ABNORMAL
GLOBULIN SER CALC-MCNC: 3.7 G/DL (ref 2.3–3.5)
GLUCOSE SERPL-MCNC: 218 MG/DL (ref 70–99)
HCO3 BLD-SCNC: 16.4 MMOL/L (ref 22–26)
HCT VFR BLD AUTO: 42 % (ref 35.8–46.3)
HGB BLD-MCNC: 12.6 G/DL (ref 11.7–15.4)
IMM GRANULOCYTES # BLD AUTO: 0.25 K/UL (ref 0–0.5)
IMM GRANULOCYTES NFR BLD AUTO: 2.4 % (ref 0–5)
LACTATE SERPL-SCNC: 11.8 MMOL/L (ref 0.5–2)
LYMPHOCYTES # BLD: 2.83 K/UL (ref 0.5–4.6)
LYMPHOCYTES NFR BLD: 26.8 % (ref 13–44)
MCH RBC QN AUTO: 29.2 PG (ref 26.1–32.9)
MCHC RBC AUTO-ENTMCNC: 30 G/DL (ref 31.4–35)
MCV RBC AUTO: 97.2 FL (ref 82–102)
MONOCYTES # BLD: 0.46 K/UL (ref 0.1–1.3)
MONOCYTES NFR BLD: 4.4 % (ref 4–12)
NEUTS SEG # BLD: 6.73 K/UL (ref 1.7–8.2)
NEUTS SEG NFR BLD: 63.7 % (ref 43–78)
NRBC # BLD: 0.02 K/UL (ref 0–0.2)
NT PRO BNP: 2559 PG/ML (ref 0–450)
O2/TOTAL GAS SETTING VFR VENT: 100 %
PCO2 BLD: 51 MMHG (ref 35–45)
PEEP RESPIRATORY: 8 CMH2O
PH BLD: 7.11 (ref 7.35–7.45)
PLATELET # BLD AUTO: 227 K/UL (ref 150–450)
PMV BLD AUTO: 11.2 FL (ref 9.4–12.3)
PO2 BLD: 338 MMHG (ref 75–100)
POTASSIUM SERPL-SCNC: 3.9 MMOL/L (ref 3.5–5.1)
PROT SERPL-MCNC: 6.7 G/DL (ref 6.3–8.2)
RBC # BLD AUTO: 4.32 M/UL (ref 4.05–5.2)
RESPIRATORY RATE, POC: 24 (ref 5–40)
SAO2 % BLD: 99.9 % (ref 95–98)
SARS-COV-2 RDRP RESP QL NAA+PROBE: NOT DETECTED
SERVICE CMNT-IMP: ABNORMAL
SODIUM SERPL-SCNC: 142 MMOL/L (ref 136–145)
SOURCE: NORMAL
SPECIMEN TYPE: ABNORMAL
TROPONIN T SERPL HS-MCNC: 54 NG/L (ref 0–14)
VENTILATION MODE VENT: ABNORMAL
VT SETTING VENT: 400 ML
WBC # BLD AUTO: 10.6 K/UL (ref 4.3–11.1)

## 2025-01-30 PROCEDURE — 5A1935Z RESPIRATORY VENTILATION, LESS THAN 24 CONSECUTIVE HOURS: ICD-10-PCS | Performed by: GENERAL PRACTICE

## 2025-01-30 PROCEDURE — 71045 X-RAY EXAM CHEST 1 VIEW: CPT

## 2025-01-30 PROCEDURE — 2500000003 HC RX 250 WO HCPCS: Performed by: NURSE PRACTITIONER

## 2025-01-30 PROCEDURE — 96374 THER/PROPH/DIAG INJ IV PUSH: CPT

## 2025-01-30 PROCEDURE — 5A12012 PERFORMANCE OF CARDIAC OUTPUT, SINGLE, MANUAL: ICD-10-PCS | Performed by: GENERAL PRACTICE

## 2025-01-30 PROCEDURE — 96375 TX/PRO/DX INJ NEW DRUG ADDON: CPT

## 2025-01-30 PROCEDURE — 80053 COMPREHEN METABOLIC PANEL: CPT

## 2025-01-30 PROCEDURE — 96376 TX/PRO/DX INJ SAME DRUG ADON: CPT

## 2025-01-30 PROCEDURE — 93010 ELECTROCARDIOGRAM REPORT: CPT | Performed by: INTERNAL MEDICINE

## 2025-01-30 PROCEDURE — 36600 WITHDRAWAL OF ARTERIAL BLOOD: CPT

## 2025-01-30 PROCEDURE — 82803 BLOOD GASES ANY COMBINATION: CPT

## 2025-01-30 PROCEDURE — 93005 ELECTROCARDIOGRAM TRACING: CPT | Performed by: GENERAL PRACTICE

## 2025-01-30 PROCEDURE — 3E033XZ INTRODUCTION OF VASOPRESSOR INTO PERIPHERAL VEIN, PERCUTANEOUS APPROACH: ICD-10-PCS | Performed by: GENERAL PRACTICE

## 2025-01-30 PROCEDURE — 5A12012 PERFORMANCE OF CARDIAC OUTPUT, SINGLE, MANUAL: ICD-10-PCS | Performed by: INTERNAL MEDICINE

## 2025-01-30 PROCEDURE — 84484 ASSAY OF TROPONIN QUANT: CPT

## 2025-01-30 PROCEDURE — 83605 ASSAY OF LACTIC ACID: CPT

## 2025-01-30 PROCEDURE — 94002 VENT MGMT INPAT INIT DAY: CPT

## 2025-01-30 PROCEDURE — 99285 EMERGENCY DEPT VISIT HI MDM: CPT

## 2025-01-30 PROCEDURE — 2580000003 HC RX 258: Performed by: NURSE PRACTITIONER

## 2025-01-30 PROCEDURE — 85025 COMPLETE CBC W/AUTO DIFF WBC: CPT

## 2025-01-30 PROCEDURE — 83880 ASSAY OF NATRIURETIC PEPTIDE: CPT

## 2025-01-30 PROCEDURE — 1100000000 HC RM PRIVATE

## 2025-01-30 PROCEDURE — 99291 CRITICAL CARE FIRST HOUR: CPT | Performed by: INTERNAL MEDICINE

## 2025-01-30 PROCEDURE — 87502 INFLUENZA DNA AMP PROBE: CPT

## 2025-01-30 PROCEDURE — 6360000002 HC RX W HCPCS: Performed by: NURSE PRACTITIONER

## 2025-01-30 PROCEDURE — 0BH17EZ INSERTION OF ENDOTRACHEAL AIRWAY INTO TRACHEA, VIA NATURAL OR ARTIFICIAL OPENING: ICD-10-PCS | Performed by: GENERAL PRACTICE

## 2025-01-30 PROCEDURE — 87635 SARS-COV-2 COVID-19 AMP PRB: CPT

## 2025-01-30 RX ORDER — ONDANSETRON 2 MG/ML
4 INJECTION INTRAMUSCULAR; INTRAVENOUS EVERY 6 HOURS PRN
Status: DISCONTINUED | OUTPATIENT
Start: 2025-01-30 | End: 2025-01-30 | Stop reason: HOSPADM

## 2025-01-30 RX ORDER — LOPERAMIDE HYDROCHLORIDE 2 MG/1
2 CAPSULE ORAL PRN
Status: DISCONTINUED | OUTPATIENT
Start: 2025-01-30 | End: 2025-01-30 | Stop reason: HOSPADM

## 2025-01-30 RX ORDER — ACETAMINOPHEN 325 MG/1
650 TABLET ORAL EVERY 4 HOURS PRN
Status: DISCONTINUED | OUTPATIENT
Start: 2025-01-30 | End: 2025-01-30 | Stop reason: HOSPADM

## 2025-01-30 RX ORDER — MORPHINE SULFATE 20 MG/ML
5 SOLUTION ORAL
Status: DISCONTINUED | OUTPATIENT
Start: 2025-01-30 | End: 2025-01-30 | Stop reason: HOSPADM

## 2025-01-30 RX ORDER — ACETAMINOPHEN 650 MG/1
650 SUPPOSITORY RECTAL EVERY 4 HOURS PRN
Status: DISCONTINUED | OUTPATIENT
Start: 2025-01-30 | End: 2025-01-30 | Stop reason: HOSPADM

## 2025-01-30 RX ORDER — MORPHINE SULFATE 2 MG/ML
2 INJECTION, SOLUTION INTRAMUSCULAR; INTRAVENOUS
Status: DISCONTINUED | OUTPATIENT
Start: 2025-01-30 | End: 2025-01-30 | Stop reason: HOSPADM

## 2025-01-30 RX ORDER — MORPHINE SULFATE 4 MG/ML
4 INJECTION, SOLUTION INTRAMUSCULAR; INTRAVENOUS
Status: DISCONTINUED | OUTPATIENT
Start: 2025-01-30 | End: 2025-01-30 | Stop reason: HOSPADM

## 2025-01-30 RX ORDER — POLYETHYLENE GLYCOL 3350 17 G/17G
17 POWDER, FOR SOLUTION ORAL DAILY PRN
Status: DISCONTINUED | OUTPATIENT
Start: 2025-01-30 | End: 2025-01-30 | Stop reason: HOSPADM

## 2025-01-30 RX ORDER — GLYCOPYRROLATE 0.2 MG/ML
0.2 INJECTION INTRAMUSCULAR; INTRAVENOUS EVERY 4 HOURS PRN
Status: DISCONTINUED | OUTPATIENT
Start: 2025-01-30 | End: 2025-01-30 | Stop reason: HOSPADM

## 2025-01-30 RX ORDER — BISACODYL 10 MG
10 SUPPOSITORY, RECTAL RECTAL DAILY PRN
Status: DISCONTINUED | OUTPATIENT
Start: 2025-01-30 | End: 2025-01-30 | Stop reason: HOSPADM

## 2025-01-30 RX ADMIN — MORPHINE SULFATE 2 MG: 2 INJECTION, SOLUTION INTRAMUSCULAR; INTRAVENOUS at 14:19

## 2025-01-30 RX ADMIN — MORPHINE SULFATE 2 MG: 2 INJECTION, SOLUTION INTRAMUSCULAR; INTRAVENOUS at 15:12

## 2025-01-30 RX ADMIN — MORPHINE SULFATE 2 MG: 2 INJECTION, SOLUTION INTRAMUSCULAR; INTRAVENOUS at 13:16

## 2025-01-30 RX ADMIN — MORPHINE SULFATE 2 MG: 2 INJECTION, SOLUTION INTRAMUSCULAR; INTRAVENOUS at 11:15

## 2025-01-30 RX ADMIN — SODIUM CHLORIDE 1 MG: 9 INJECTION INTRAMUSCULAR; INTRAVENOUS; SUBCUTANEOUS at 10:26

## 2025-01-30 RX ADMIN — MORPHINE SULFATE 2 MG: 2 INJECTION, SOLUTION INTRAMUSCULAR; INTRAVENOUS at 12:16

## 2025-01-30 RX ADMIN — MORPHINE SULFATE 2 MG: 2 INJECTION, SOLUTION INTRAMUSCULAR; INTRAVENOUS at 10:16

## 2025-01-30 ASSESSMENT — PAIN SCALES - GENERAL
PAINLEVEL_OUTOF10: 0
PAINLEVEL_OUTOF10: 0

## 2025-01-30 ASSESSMENT — LIFESTYLE VARIABLES
HOW MANY STANDARD DRINKS CONTAINING ALCOHOL DO YOU HAVE ON A TYPICAL DAY: PATIENT UNABLE TO ANSWER
HOW OFTEN DO YOU HAVE A DRINK CONTAINING ALCOHOL: PATIENT UNABLE TO ANSWER

## 2025-01-30 ASSESSMENT — PULMONARY FUNCTION TESTS: PIF_VALUE: 26

## 2025-01-30 ASSESSMENT — PAIN - FUNCTIONAL ASSESSMENT: PAIN_FUNCTIONAL_ASSESSMENT: ADULT NONVERBAL PAIN SCALE (NPVS)

## 2025-01-30 NOTE — ED PROVIDER NOTES
Emergency Department Provider Note       PCP: No primary care provider on file.   Age: 89 y.o.   Sex: female     DISPOSITION                No diagnosis found.    Medical Decision Making     ***     {Complexity:30330}  {Risk:16736}    I independently ordered and reviewed each unique test.  {external source:59419}   {Historian (state who, why needed, what they said):44875}  {test reviewed:94792}  My Independent EKG Interpretation: sinus rhythm, no evidence of arrhythmia      ST Segments:Normal ST segments - NO STEMI   Rate: 112  {Admitted or Consultants involved.:89712}  {MIPS URI - Strep - Sinusitis - Pregnant - Head Trauma - Overdose - Agitation:32296}  {SEP1 yes/no:74471}  {Critical Care:83602}    History     89 yo female arrives via EMS after collapsing at home and going into cardiac arrest. Friend witnessed the collapse and stated that pt was having flu-like symptoms for several days prior. Per triage note, when EMS arrived to scene, she was asystolic, converted to PEA after 2nd epi, after 3rd epi at 7:08 am, ROSC. She rearrested at 7:36 am and received another dose of epi, ROSC again at 7:48 AM. Arrived to [facility] ed with 6.0-24 intubated at the teeth. Pulse and bp present at arrival.       ROS     Review of Systems     Physical Exam     Vitals signs and nursing note reviewed:  Vitals:    01/30/25 0805   BP: 116/68   Pulse: (!) 111   Resp: 26      Physical Exam  Constitutional:       Comments: Intubated, unresponsive   HENT:      Head: Normocephalic and atraumatic.      Comments: Bloody emesis in mouth      Right Ear: External ear normal.      Left Ear: External ear normal.      Nose: No congestion or rhinorrhea.      Mouth/Throat:      Comments: ET tube through cords   Eyes:      Comments: Pupils fixed and dilated    Cardiovascular:      Rate and Rhythm: Regular rhythm. Tachycardia present.      Comments: LBB demonstrated on EKG.   Pulmonary:      Comments: Breath sounds auscultated with bag    Neurological:      Comments: GCS 3. Intermittent spontaneous respirations         Procedures     Critical Care    Performed by: Hung Dumont DO  Authorized by: Gunner Blair MD    Critical care provider statement:     Critical care time (minutes):  60    Critical care time was exclusive of:  Separately billable procedures and treating other patients    Critical care was necessary to treat or prevent imminent or life-threatening deterioration of the following conditions:  Cardiac failure, circulatory failure and CNS failure or compromise    Critical care was time spent personally by me on the following activities:  Blood draw for specimens, development of treatment plan with patient or surrogate, discussions with consultants, evaluation of patient's response to treatment, examination of patient, interpretation of cardiac output measurements, obtaining history from patient or surrogate, ordering and performing treatments and interventions, ordering and review of laboratory studies, ordering and review of radiographic studies, pulse oximetry and re-evaluation of patient's condition    Care discussed with: admitting provider    Comments:      Patient was a cardiopulmonary arrest requiring intubation and admission to the intensivist  Intubation    Date/Time: 2/3/2025 7:14 AM    Performed by: Hung Dumont DO  Authorized by: Gunner Blair MD    Consent:     Consent obtained:  Emergent situation    Risks discussed:  Aspiration, brain injury, dental trauma, laryngeal injury, pneumothorax, hypoxia, death and bleeding    Alternatives discussed:  No treatment  Universal protocol:     Procedure explained and questions answered to patient or proxy's satisfaction: no    Pre-procedure details:     Indications: respiratory failure      Indications comment:  Decided to change 6.0 ET tube to a 7.5 ET tube    Patient status:  Unresponsive    Look externally: no concerns      Mouth opening - incisor distance:  3 or more finger

## 2025-01-30 NOTE — ED TRIAGE NOTES
Patient to triage via EMS from home after having flu like symptoms for a few days and a friend witnessed her collapse back in the bed called 911. When EMS arrived on scene pt was asystolic, which then converted to PEA after the 2nd epi. After the 3rd epi at 0708 they got ROSC. En route she rearrested at 0736, she got 1 more epi and they got ROSC again at 0748. Pt currently intubated with 6.0- 24 at the teeth. MD at bedside

## 2025-01-30 NOTE — PROGRESS NOTES
Spiritual Health History and Assessment/Progress Note  [facility]    Grief, Loss, and Adjustments,  , End of Life,      Name: Kathryn Lewis MRN: [id number]    Age: 89 y.o.     Sex: female   Language: English   Christian: Shinto   <principal problem not specified>     Date: 1/30/2025            Total Time Calculated: 30 min              Spiritual Assessment began in [facility] EMERGENCY DEPARTMENT        Referral/Consult From: Family, Nurse   Encounter Overview/Reason: Grief, Loss, and Adjustments  Service Provided For: Patient and family together    Sandie, Belief, Meaning:   Patient is connected with a sandie tradition or spiritual practice and has beliefs or practices that help with coping during difficult times  Family/Friends are connected with a sandie tradition or spiritual practice and have beliefs or practices that help with coping during difficult times      Importance and Influence:  Patient has spiritual/personal beliefs that influence decisions regarding their health  Family/Friends have spiritual/personal beliefs that influence decisions regarding the patient's health    Community:  Patient Other: unable to respond  Family/Friends feel well-supported. Support system includes: Sandie Community and Extended family    Assessment and Plan of Care:     Patient Interventions include: Other: prayer  Family/Friends Interventions include: Facilitated expression of thoughts and feelings, Explored spiritual coping/struggle/distress, Engaged in theological reflection, Affirmed coping skills/support systems, Facilitated life review and/or legacy, and Other: prayer    Patient Plan of Care: Spiritual Care available upon further referral  Family/Friends Plan of Care: Spiritual Care available upon further referral    Electronically signed by Chaplain Sudha on 1/30/2025 at 10:56 AM      responded per request of family as patient is on comfort care.  Patient did not respond, but

## 2025-01-30 NOTE — H&P
Hospitalist History and Physical   Admit Date:  2025  8:14 AM   Name:  Kathryn Lewis   Age:  89 y.o.  Sex:  female  :  10/23/1926   MRN:  [id number]   Room:  2/[facility]    Presenting/Chief Complaint: Cardiac Arrest     Reason(s) for Admission: Cardiac arrest [I46.9]     History of Present Illness:   Kathryn Lewis is a 89 y.o. female with medical history of chronic diastolic CHF, atrial fibrillation, hypertension, hyperlipidemia who presented from home after a cardiac arrest.  It was reported that patient called her friend this morning due to not feeling well.  When son arrived, patient was alert and talking then collapsed.  EMS was called.  Patient was started on CPR and ROSC was achieved after about 30 minutes.  Patient subsequently had another unfortunate cardiac arrest.    Chest x-ray shows cardiomegaly with pulmonary edema.  Laboratory workup with creatinine of 1.26, lactic acid 11.8, pH was 7.11, pO2 99.9, pCO2 of 51 while on ventilator.  Family presented later and reported that patient has living will stating that she does not want to be kept alive on machines.  Therefore at this time patient was extubated.    Patient is being admitted for comfort care measures.      Assessment & Plan:   Cardiac arrest  Had 2 cardiac arrest episodes this morning and was intubated in the field.  - Now extubated as patient has living will stating DNR.    Comfort Care  Palliative care encounter  DNR  - will place patient on comfort care  - will consult hospice but likely will not be able to transport due to her current condition.    Code status: DNR        Hospital Problems:  Principal Problem:    Cardiac arrest  Active Problems:    Palliative care encounter    DNR (do not resuscitate)    HTN (hypertension)    Chronic diastolic congestive heart failure (HCC)    HLD (hyperlipidemia)    A-fib (HCC)  Resolved Problems:    * No resolved hospital problems. *        Objective:   Patient Vitals for the past 24 hrs:

## 2025-01-30 NOTE — ED NOTES
Pt went asystolic on cardiac monitor, spo2 stopped, and pt became hypotensive. Patient had no pulse. Messaged provider to come and pronounce time of death. Family still at bedside       Matthew Bolaños RN  01/30/25 [id number]

## 2025-01-30 NOTE — ACP (ADVANCE CARE PLANNING)
Advanced Care Planning (Initial Encounter)      Name: Kathryn Lewis            Age: 89 y.o.        Sex: female  : 10/23/1926    MRN:     [id number]    Date of ACP Conversation: 25    Conversation Conducted with: Patient's family - son and grand children  ve adequate capacity to make medical decisions. As such, this encounter was discussed with her family      Discussed the current conditions, management plans.  Family explained that patient had living will which states that she is DNR and does not want her life to be prolonged on machines.     Time Spent face to face with patient/family: 16 mins (This is addition to time spent for clinical care)        Code Status: DNR  Designated Health Care Decision Maker: Jennifer Gerber MD

## 2025-01-30 NOTE — DISCHARGE SUMMARY
Hospitalist Discharge Summary for  Patient   Admit Date:  2025  8:14 AM   DC Note date: 2025  Name:  Kathryn Lewis   Age:  89 y.o.  Sex:  female  :  10/23/1926   MRN:  [id number]   [facility]  PCP:  Gal Ingram MD    Presenting Complaint: Cardiac Arrest     Initial Admission Diagnosis: Cardiac arrest [I46.9]     Problem List for this Hospitalization (present on admission):    Principal Problem:    Cardiac arrest  Active Problems:    Palliative care encounter    DNR (do not resuscitate)    HTN (hypertension)    Chronic diastolic congestive heart failure (HCC)    HLD (hyperlipidemia)    A-fib (HCC)  Resolved Problems:    * No resolved hospital problems. *      Hospital Course:  Kathryn Lewis is a 89 y.o. female with medical history of chronic diastolic CHF, atrial fibrillation, hypertension, hyperlipidemia who presented from home after a cardiac arrest.  It was reported that patient called her friend this morning due to not feeling well.  When son arrived, patient was alert and talking then collapsed.  EMS was called.  Patient was started on CPR and ROSC was achieved after about 30 minutes.  Patient subsequently had another unfortunate cardiac arrest.     Chest x-ray shows cardiomegaly with pulmonary edema.  Laboratory workup with creatinine of 1.26, lactic acid 11.8, pH was 7.11, pO2 99.9, pCO2 of 51 while on ventilator.  Family presented later and reported that patient has living will stating that she does not want to be kept alive on machines.  Therefore at this time patient was extubated.     Patient is being admitted for comfort care measures.    Time of Death:   1538    Cause of Death:   Cardiac Arrest    Time spent in patient discharge pronouncement, documentation, and death certification 33 minutes.      Procedures done this admission:  * No surgery found *    Consults this admission:  IP CONSULT TO SPIRITUAL SERVICES  IP CONSULT TO HOSPICE    Echocardiogram